# Patient Record
Sex: FEMALE | Race: BLACK OR AFRICAN AMERICAN | NOT HISPANIC OR LATINO | URBAN - METROPOLITAN AREA
[De-identification: names, ages, dates, MRNs, and addresses within clinical notes are randomized per-mention and may not be internally consistent; named-entity substitution may affect disease eponyms.]

---

## 2017-07-16 ENCOUNTER — EMERGENCY (EMERGENCY)
Facility: HOSPITAL | Age: 57
LOS: 0 days | Discharge: HOME | End: 2017-07-16
Attending: EMERGENCY MEDICINE
Payer: COMMERCIAL

## 2017-07-16 VITALS
WEIGHT: 184.97 LBS | DIASTOLIC BLOOD PRESSURE: 70 MMHG | HEIGHT: 67 IN | TEMPERATURE: 99 F | OXYGEN SATURATION: 98 % | RESPIRATION RATE: 16 BRPM | HEART RATE: 74 BPM | SYSTOLIC BLOOD PRESSURE: 132 MMHG

## 2017-07-16 DIAGNOSIS — H57.8 OTHER SPECIFIED DISORDERS OF EYE AND ADNEXA: ICD-10-CM

## 2017-07-16 DIAGNOSIS — B30.9 VIRAL CONJUNCTIVITIS, UNSPECIFIED: ICD-10-CM

## 2017-07-16 DIAGNOSIS — H57.12 OCULAR PAIN, LEFT EYE: ICD-10-CM

## 2017-07-16 PROCEDURE — 99283 EMERGENCY DEPT VISIT LOW MDM: CPT

## 2017-07-16 NOTE — ED PROVIDER NOTE - EYES, MLM
Pupils equal, round and reactive to light, left erythematous conjunctiva, visual acuity grossly intact

## 2017-07-16 NOTE — ED ADULT NURSE NOTE - OBJECTIVE STATEMENT
56 y o female c/o L eye redness, tearing, discomfort x today. 56 y o female c/o L eye redness, tearing, discomfort x today. denies changes in vision.

## 2020-01-19 ENCOUNTER — EMERGENCY (EMERGENCY)
Facility: HOSPITAL | Age: 60
LOS: 0 days | Discharge: HOME | End: 2020-01-19
Attending: EMERGENCY MEDICINE
Payer: MEDICAID

## 2020-01-19 VITALS
HEIGHT: 67 IN | TEMPERATURE: 102 F | DIASTOLIC BLOOD PRESSURE: 79 MMHG | RESPIRATION RATE: 17 BRPM | OXYGEN SATURATION: 109 % | HEART RATE: 109 BPM | SYSTOLIC BLOOD PRESSURE: 127 MMHG | WEIGHT: 184.09 LBS

## 2020-01-19 VITALS
DIASTOLIC BLOOD PRESSURE: 85 MMHG | TEMPERATURE: 101 F | HEART RATE: 89 BPM | RESPIRATION RATE: 16 BRPM | SYSTOLIC BLOOD PRESSURE: 156 MMHG | OXYGEN SATURATION: 100 %

## 2020-01-19 DIAGNOSIS — J11.1 INFLUENZA DUE TO UNIDENTIFIED INFLUENZA VIRUS WITH OTHER RESPIRATORY MANIFESTATIONS: ICD-10-CM

## 2020-01-19 DIAGNOSIS — R50.9 FEVER, UNSPECIFIED: ICD-10-CM

## 2020-01-19 DIAGNOSIS — R52 PAIN, UNSPECIFIED: ICD-10-CM

## 2020-01-19 DIAGNOSIS — I10 ESSENTIAL (PRIMARY) HYPERTENSION: ICD-10-CM

## 2020-01-19 DIAGNOSIS — R07.9 CHEST PAIN, UNSPECIFIED: ICD-10-CM

## 2020-01-19 LAB
ALBUMIN SERPL ELPH-MCNC: 3.7 G/DL — SIGNIFICANT CHANGE UP (ref 3.3–5)
ALP SERPL-CCNC: 81 U/L — SIGNIFICANT CHANGE UP (ref 40–120)
ALT FLD-CCNC: 29 U/L — SIGNIFICANT CHANGE UP (ref 12–78)
ANION GAP SERPL CALC-SCNC: 7 MMOL/L — SIGNIFICANT CHANGE UP (ref 5–17)
ANISOCYTOSIS BLD QL: SLIGHT — SIGNIFICANT CHANGE UP
AST SERPL-CCNC: 23 U/L — SIGNIFICANT CHANGE UP (ref 15–37)
BASOPHILS # BLD AUTO: 0 K/UL — SIGNIFICANT CHANGE UP (ref 0–0.2)
BASOPHILS NFR BLD AUTO: 0 % — SIGNIFICANT CHANGE UP (ref 0–2)
BILIRUB SERPL-MCNC: 0.5 MG/DL — SIGNIFICANT CHANGE UP (ref 0.2–1.2)
BUN SERPL-MCNC: 13 MG/DL — SIGNIFICANT CHANGE UP (ref 7–23)
CALCIUM SERPL-MCNC: 8.9 MG/DL — SIGNIFICANT CHANGE UP (ref 8.5–10.1)
CHLORIDE SERPL-SCNC: 100 MMOL/L — SIGNIFICANT CHANGE UP (ref 96–108)
CO2 SERPL-SCNC: 31 MMOL/L — SIGNIFICANT CHANGE UP (ref 22–31)
CREAT SERPL-MCNC: 1.18 MG/DL — SIGNIFICANT CHANGE UP (ref 0.5–1.3)
EOSINOPHIL # BLD AUTO: 0 K/UL — SIGNIFICANT CHANGE UP (ref 0–0.5)
EOSINOPHIL NFR BLD AUTO: 0 % — SIGNIFICANT CHANGE UP (ref 0–6)
GLUCOSE SERPL-MCNC: 108 MG/DL — HIGH (ref 70–99)
HCT VFR BLD CALC: 45 % — SIGNIFICANT CHANGE UP (ref 34.5–45)
HGB BLD-MCNC: 14.6 G/DL — SIGNIFICANT CHANGE UP (ref 11.5–15.5)
LYMPHOCYTES # BLD AUTO: 1.19 K/UL — SIGNIFICANT CHANGE UP (ref 1–3.3)
LYMPHOCYTES # BLD AUTO: 17 % — SIGNIFICANT CHANGE UP (ref 13–44)
MAGNESIUM SERPL-MCNC: 2.2 MG/DL — SIGNIFICANT CHANGE UP (ref 1.6–2.6)
MANUAL SMEAR VERIFICATION: SIGNIFICANT CHANGE UP
MCHC RBC-ENTMCNC: 31.1 PG — SIGNIFICANT CHANGE UP (ref 27–34)
MCHC RBC-ENTMCNC: 32.4 GM/DL — SIGNIFICANT CHANGE UP (ref 32–36)
MCV RBC AUTO: 95.7 FL — SIGNIFICANT CHANGE UP (ref 80–100)
MICROCYTES BLD QL: SLIGHT — SIGNIFICANT CHANGE UP
MONOCYTES # BLD AUTO: 0.98 K/UL — HIGH (ref 0–0.9)
MONOCYTES NFR BLD AUTO: 14 % — SIGNIFICANT CHANGE UP (ref 2–14)
NEUTROPHILS # BLD AUTO: 4.82 K/UL — SIGNIFICANT CHANGE UP (ref 1.8–7.4)
NEUTROPHILS NFR BLD AUTO: 69 % — SIGNIFICANT CHANGE UP (ref 43–77)
NRBC # BLD: 0 /100 — SIGNIFICANT CHANGE UP (ref 0–0)
NRBC # BLD: SIGNIFICANT CHANGE UP /100 WBCS (ref 0–0)
PLAT MORPH BLD: NORMAL — SIGNIFICANT CHANGE UP
PLATELET # BLD AUTO: 189 K/UL — SIGNIFICANT CHANGE UP (ref 150–400)
POTASSIUM SERPL-MCNC: 3.5 MMOL/L — SIGNIFICANT CHANGE UP (ref 3.5–5.3)
POTASSIUM SERPL-SCNC: 3.5 MMOL/L — SIGNIFICANT CHANGE UP (ref 3.5–5.3)
PROT SERPL-MCNC: 7.5 GM/DL — SIGNIFICANT CHANGE UP (ref 6–8.3)
RBC # BLD: 4.7 M/UL — SIGNIFICANT CHANGE UP (ref 3.8–5.2)
RBC # FLD: 14 % — SIGNIFICANT CHANGE UP (ref 10.3–14.5)
RBC BLD AUTO: ABNORMAL
SODIUM SERPL-SCNC: 138 MMOL/L — SIGNIFICANT CHANGE UP (ref 135–145)
TOXIC GRANULES BLD QL SMEAR: PRESENT — SIGNIFICANT CHANGE UP
WBC # BLD: 6.98 K/UL — SIGNIFICANT CHANGE UP (ref 3.8–10.5)
WBC # FLD AUTO: 6.98 K/UL — SIGNIFICANT CHANGE UP (ref 3.8–10.5)

## 2020-01-19 PROCEDURE — 99284 EMERGENCY DEPT VISIT MOD MDM: CPT

## 2020-01-19 RX ORDER — LOSARTAN/HYDROCHLOROTHIAZIDE 100MG-25MG
1 TABLET ORAL
Qty: 0 | Refills: 0 | DISCHARGE

## 2020-01-19 RX ORDER — SODIUM CHLORIDE 9 MG/ML
1000 INJECTION, SOLUTION INTRAVENOUS ONCE
Refills: 0 | Status: COMPLETED | OUTPATIENT
Start: 2020-01-19 | End: 2020-01-19

## 2020-01-19 RX ORDER — ACETAMINOPHEN 500 MG
975 TABLET ORAL ONCE
Refills: 0 | Status: COMPLETED | OUTPATIENT
Start: 2020-01-19 | End: 2020-01-19

## 2020-01-19 RX ORDER — PHENYLEPHRINE/DM/ACETAMINOP/GG 5-10-325MG
2 TABLET ORAL
Qty: 30 | Refills: 0
Start: 2020-01-19 | End: 2020-01-23

## 2020-01-19 RX ORDER — SODIUM CHLORIDE 9 MG/ML
1000 INJECTION INTRAMUSCULAR; INTRAVENOUS; SUBCUTANEOUS ONCE
Refills: 0 | Status: COMPLETED | OUTPATIENT
Start: 2020-01-19 | End: 2020-01-19

## 2020-01-19 RX ORDER — KETOROLAC TROMETHAMINE 30 MG/ML
30 SYRINGE (ML) INJECTION ONCE
Refills: 0 | Status: DISCONTINUED | OUTPATIENT
Start: 2020-01-19 | End: 2020-01-19

## 2020-01-19 RX ADMIN — SODIUM CHLORIDE 1000 MILLILITER(S): 9 INJECTION, SOLUTION INTRAVENOUS at 11:18

## 2020-01-19 RX ADMIN — Medication 30 MILLIGRAM(S): at 11:23

## 2020-01-19 RX ADMIN — Medication 75 MILLIGRAM(S): at 11:22

## 2020-01-19 RX ADMIN — SODIUM CHLORIDE 1000 MILLILITER(S): 9 INJECTION INTRAMUSCULAR; INTRAVENOUS; SUBCUTANEOUS at 11:18

## 2020-01-19 RX ADMIN — Medication 100 MILLIGRAM(S): at 11:22

## 2020-01-19 RX ADMIN — Medication 975 MILLIGRAM(S): at 11:23

## 2020-01-19 NOTE — ED PROVIDER NOTE - PATIENT PORTAL LINK FT
You can access the FollowMyHealth Patient Portal offered by [facility] by registering at the following website: [email or web address]. By joining [facility]’s FollowMyHealth portal, you will also be able to view your health information using other applications (apps) compatible with our system.

## 2020-01-19 NOTE — ED PROVIDER NOTE - OBJECTIVE STATEMENT
60 y/o F with a PMHx of HTN presents with complains of flu like symptoms. She states her symptoms beginning yesterday and the day before she was feeling well. She states that she has had fevers, cp, body aches, and headache. she reports she did obtain her flu shot. she reports in previous coughing episodes she had some burning sensations but currently the burning has subsided.

## 2020-01-19 NOTE — ED PROVIDER NOTE - CLINICAL SUMMARY MEDICAL DECISION MAKING FREE TEXT BOX
viral uri likely influenza. will start Tamiflu; rule out pneumonia viral uri likely influenza. will start Tamiflu; rule out pneumonia. xray clear and labs reassuring. after fluids and pain medicine and anti-pyretics pt feeling better. ok for dc home.

## 2020-01-19 NOTE — ED PROVIDER NOTE - PHYSICAL EXAMINATION
Gen: Alert, NAD  Head: NC, AT   Eyes: PERRL, EOMI, normal lids/conjunctiva  ENT: normal hearing, patent oropharynx without erythema/exudate, uvula midline  Neck: supple, no tenderness, Trachea midline  Pulm: +tachycardic, Bilateral BS, normal resp effort, no wheeze/stridor/retractions  CV: RRR, no M/R/G, 2+ radial and dp pulses bl, no edema  Abd: soft, NT/ND, +BS, no hepatosplenomegaly  Mskel: extremities x4 with normal ROM and no joint effusions. no ctl spine ttp.   Skin: no rash, no bruising   Neuro: AAOx3, no sensory/motor deficits, CN 2-12 intact

## 2020-01-19 NOTE — ED ADULT NURSE NOTE - OBJECTIVE STATEMENT
pt a&o x3, pt c.o of fever at home, chills, cough, body aches x 1 days. pt denies shortness of breath + cough

## 2020-01-19 NOTE — ED PROVIDER NOTE - NSFOLLOWUPINSTRUCTIONS_ED_ALL_ED_FT
Unknown
Take the OSELTAMIVIR as prescribed to help with flu symptoms.    Take the cough medicine as needed.    Get plenty of rest and keep well hydrated.

## 2022-08-21 NOTE — ED ADULT NURSE NOTE - NS ED NOTE  TALK SOMEONE YN
Thank you for allowing us to provide you with medical care today. We realize that you have many choices for your emergency care needs. We thank you for choosing [facility]. Please choose us in the future for any continued health care needs. We hope we addressed all of your medical concerns. We strive to provide excellent quality care in the Emergency Department. Anything less than excellent does not meet our expectations. The exam and treatment you received in the Emergency Department were for an emergent problem and are not intended as complete care. It is important that you follow up with a doctor, nurse practitioner, or physician's assistant for ongoing care. If your symptoms worsen or you do not improve as expected and you are unable to reach your usual health care provider, you should return to the Emergency Department. We are available 24 hours a day. Take this sheet with you when you go to your follow-up visit. If you have any problem arranging the follow-up visit, contact the Emergency Department immediately. Make an appointment your family doctor for follow up of this visit. Return to the ER if you are unable to be seen in a timely manner.
No

## 2023-03-03 ENCOUNTER — EMERGENCY (EMERGENCY)
Facility: HOSPITAL | Age: 63
LOS: 0 days | Discharge: HOME | End: 2023-03-03
Attending: EMERGENCY MEDICINE
Payer: COMMERCIAL

## 2023-03-03 VITALS
DIASTOLIC BLOOD PRESSURE: 80 MMHG | RESPIRATION RATE: 17 BRPM | TEMPERATURE: 98 F | SYSTOLIC BLOOD PRESSURE: 146 MMHG | HEART RATE: 79 BPM | OXYGEN SATURATION: 97 %

## 2023-03-03 VITALS
SYSTOLIC BLOOD PRESSURE: 136 MMHG | DIASTOLIC BLOOD PRESSURE: 83 MMHG | HEIGHT: 67 IN | HEART RATE: 96 BPM | RESPIRATION RATE: 15 BRPM | TEMPERATURE: 99 F | OXYGEN SATURATION: 97 % | WEIGHT: 194.89 LBS

## 2023-03-03 DIAGNOSIS — U07.1 COVID-19: ICD-10-CM

## 2023-03-03 DIAGNOSIS — I10 ESSENTIAL (PRIMARY) HYPERTENSION: ICD-10-CM

## 2023-03-03 DIAGNOSIS — R05.1 ACUTE COUGH: ICD-10-CM

## 2023-03-03 DIAGNOSIS — M79.10 MYALGIA, UNSPECIFIED SITE: ICD-10-CM

## 2023-03-03 DIAGNOSIS — R09.81 NASAL CONGESTION: ICD-10-CM

## 2023-03-03 LAB
ALBUMIN SERPL ELPH-MCNC: 3.5 G/DL — SIGNIFICANT CHANGE UP (ref 3.3–5)
ALP SERPL-CCNC: 75 U/L — SIGNIFICANT CHANGE UP (ref 40–120)
ALT FLD-CCNC: 29 U/L — SIGNIFICANT CHANGE UP (ref 12–78)
ANION GAP SERPL CALC-SCNC: 5 MMOL/L — SIGNIFICANT CHANGE UP (ref 5–17)
APPEARANCE UR: CLEAR — SIGNIFICANT CHANGE UP
APTT BLD: 55.4 SEC — HIGH (ref 27.5–35.5)
AST SERPL-CCNC: 19 U/L — SIGNIFICANT CHANGE UP (ref 15–37)
BASOPHILS # BLD AUTO: 0.07 K/UL — SIGNIFICANT CHANGE UP (ref 0–0.2)
BASOPHILS NFR BLD AUTO: 1 % — SIGNIFICANT CHANGE UP (ref 0–2)
BILIRUB SERPL-MCNC: 0.5 MG/DL — SIGNIFICANT CHANGE UP (ref 0.2–1.2)
BILIRUB UR-MCNC: NEGATIVE — SIGNIFICANT CHANGE UP
BUN SERPL-MCNC: 8 MG/DL — SIGNIFICANT CHANGE UP (ref 7–23)
CALCIUM SERPL-MCNC: 9.3 MG/DL — SIGNIFICANT CHANGE UP (ref 8.5–10.1)
CHLORIDE SERPL-SCNC: 105 MMOL/L — SIGNIFICANT CHANGE UP (ref 96–108)
CO2 SERPL-SCNC: 29 MMOL/L — SIGNIFICANT CHANGE UP (ref 22–31)
COLOR SPEC: YELLOW — SIGNIFICANT CHANGE UP
CREAT SERPL-MCNC: 1 MG/DL — SIGNIFICANT CHANGE UP (ref 0.5–1.3)
DIFF PNL FLD: ABNORMAL
EGFR: 63 ML/MIN/1.73M2 — SIGNIFICANT CHANGE UP
EOSINOPHIL # BLD AUTO: 0.07 K/UL — SIGNIFICANT CHANGE UP (ref 0–0.5)
EOSINOPHIL NFR BLD AUTO: 1 % — SIGNIFICANT CHANGE UP (ref 0–6)
FLUAV AG NPH QL: SIGNIFICANT CHANGE UP
FLUBV AG NPH QL: SIGNIFICANT CHANGE UP
GLUCOSE SERPL-MCNC: 96 MG/DL — SIGNIFICANT CHANGE UP (ref 70–99)
GLUCOSE UR QL: NEGATIVE MG/DL — SIGNIFICANT CHANGE UP
HCT VFR BLD CALC: 41.6 % — SIGNIFICANT CHANGE UP (ref 34.5–45)
HGB BLD-MCNC: 13.5 G/DL — SIGNIFICANT CHANGE UP (ref 11.5–15.5)
INR BLD: 1.06 RATIO — SIGNIFICANT CHANGE UP (ref 0.88–1.16)
KETONES UR-MCNC: NEGATIVE — SIGNIFICANT CHANGE UP
LACTATE SERPL-SCNC: 0.9 MMOL/L — SIGNIFICANT CHANGE UP (ref 0.7–2)
LEUKOCYTE ESTERASE UR-ACNC: NEGATIVE — SIGNIFICANT CHANGE UP
LYMPHOCYTES # BLD AUTO: 0.81 K/UL — LOW (ref 1–3.3)
LYMPHOCYTES # BLD AUTO: 12 % — LOW (ref 13–44)
MANUAL SMEAR VERIFICATION: SIGNIFICANT CHANGE UP
MCHC RBC-ENTMCNC: 31.4 PG — SIGNIFICANT CHANGE UP (ref 27–34)
MCHC RBC-ENTMCNC: 32.5 G/DL — SIGNIFICANT CHANGE UP (ref 32–36)
MCV RBC AUTO: 96.7 FL — SIGNIFICANT CHANGE UP (ref 80–100)
MONOCYTES # BLD AUTO: 1.15 K/UL — HIGH (ref 0–0.9)
MONOCYTES NFR BLD AUTO: 17 % — HIGH (ref 2–14)
NEUTROPHILS # BLD AUTO: 4.65 K/UL — SIGNIFICANT CHANGE UP (ref 1.8–7.4)
NEUTROPHILS NFR BLD AUTO: 69 % — SIGNIFICANT CHANGE UP (ref 43–77)
NITRITE UR-MCNC: NEGATIVE — SIGNIFICANT CHANGE UP
NRBC # BLD: 0 /100 — SIGNIFICANT CHANGE UP (ref 0–0)
NRBC # BLD: SIGNIFICANT CHANGE UP /100 WBCS (ref 0–0)
PH UR: 7 — SIGNIFICANT CHANGE UP (ref 5–8)
PLAT MORPH BLD: NORMAL — SIGNIFICANT CHANGE UP
PLATELET # BLD AUTO: 172 K/UL — SIGNIFICANT CHANGE UP (ref 150–400)
POTASSIUM SERPL-MCNC: 3.6 MMOL/L — SIGNIFICANT CHANGE UP (ref 3.5–5.3)
POTASSIUM SERPL-SCNC: 3.6 MMOL/L — SIGNIFICANT CHANGE UP (ref 3.5–5.3)
PROT SERPL-MCNC: 6.9 GM/DL — SIGNIFICANT CHANGE UP (ref 6–8.3)
PROT UR-MCNC: NEGATIVE MG/DL — SIGNIFICANT CHANGE UP
PROTHROM AB SERPL-ACNC: 12.7 SEC — SIGNIFICANT CHANGE UP (ref 10.5–13.4)
RBC # BLD: 4.3 M/UL — SIGNIFICANT CHANGE UP (ref 3.8–5.2)
RBC # FLD: 13.5 % — SIGNIFICANT CHANGE UP (ref 10.3–14.5)
RBC BLD AUTO: NORMAL — SIGNIFICANT CHANGE UP
RBC CASTS # UR COMP ASSIST: NEGATIVE /HPF — SIGNIFICANT CHANGE UP (ref 0–4)
SARS-COV-2 RNA SPEC QL NAA+PROBE: DETECTED
SODIUM SERPL-SCNC: 139 MMOL/L — SIGNIFICANT CHANGE UP (ref 135–145)
SP GR SPEC: 1 — LOW (ref 1.01–1.02)
TOXIC GRANULES BLD QL SMEAR: PRESENT — SIGNIFICANT CHANGE UP
UROBILINOGEN FLD QL: NEGATIVE MG/DL — SIGNIFICANT CHANGE UP
WBC # BLD: 6.74 K/UL — SIGNIFICANT CHANGE UP (ref 3.8–10.5)
WBC # FLD AUTO: 6.74 K/UL — SIGNIFICANT CHANGE UP (ref 3.8–10.5)
WBC UR QL: NEGATIVE — SIGNIFICANT CHANGE UP

## 2023-03-03 PROCEDURE — 99285 EMERGENCY DEPT VISIT HI MDM: CPT

## 2023-03-03 PROCEDURE — 71045 X-RAY EXAM CHEST 1 VIEW: CPT | Mod: 26

## 2023-03-03 PROCEDURE — 93010 ELECTROCARDIOGRAM REPORT: CPT

## 2023-03-03 RX ORDER — ACETAMINOPHEN 500 MG
1000 TABLET ORAL ONCE
Refills: 0 | Status: COMPLETED | OUTPATIENT
Start: 2023-03-03 | End: 2023-03-03

## 2023-03-03 RX ORDER — IBUPROFEN 200 MG
1 TABLET ORAL
Qty: 15 | Refills: 0
Start: 2023-03-03 | End: 2023-03-07

## 2023-03-03 RX ORDER — SODIUM CHLORIDE 9 MG/ML
2700 INJECTION, SOLUTION INTRAVENOUS ONCE
Refills: 0 | Status: COMPLETED | OUTPATIENT
Start: 2023-03-03 | End: 2023-03-03

## 2023-03-03 RX ADMIN — Medication 400 MILLIGRAM(S): at 15:47

## 2023-03-03 RX ADMIN — SODIUM CHLORIDE 2700 MILLILITER(S): 9 INJECTION, SOLUTION INTRAVENOUS at 14:48

## 2023-03-03 RX ADMIN — Medication 200 MILLIGRAM(S): at 15:47

## 2023-03-03 NOTE — ED ADULT TRIAGE NOTE - CHIEF COMPLAINT QUOTE
Patient c/o generalized body aches dry cough and fever since yesterday. Patient took Tylenol last night with partial relief. Denies any sick contacts  hx HTN

## 2023-03-03 NOTE — ED PROVIDER NOTE - CONSTITUTIONAL, MLM
normal... Well appearing, awake, alert, oriented to person, place, time/situation and in no apparent distress. Speaking in clear full sentences no nasal flaring no shoulders retractions no diaphoresis + coughing

## 2023-03-03 NOTE — ED PROVIDER NOTE - GASTROINTESTINAL NEGATIVE STATEMENT, MLM
Patient was in the ER yesterday for palps, but states she feels fine today. Pt is unsure if she needs to be seen. Can you please review the ED note and see if the pt should be seen, thanks. You can reach the pt at [phone]. no abdominal pain, no bloating, no constipation, no diarrhea, no nausea and no vomiting.

## 2023-03-03 NOTE — ED PROVIDER NOTE - CARE PLAN
Principal Discharge DX:	Upper respiratory infection  Secondary Diagnosis:	2019 novel coronavirus disease (COVID-19)   1

## 2023-03-03 NOTE — ED ADULT NURSE NOTE - OBJECTIVE STATEMENT
Patient is alert and oriented x4. Complains of generalized body pain, weakness and cough that started yesterday. Denies n/v/d, fever or shortness of breath. Lungs clear on auscultation. Able to ambulate. Placed on cardiac monitor.

## 2023-03-03 NOTE — ED PROVIDER NOTE - PROGRESS NOTE DETAILS
Pt sts she is feeling much better denies headache, dizziness, neck/back pain, sob, chest pain, nausea, vomiting, abd pain.

## 2023-03-03 NOTE — ED PROVIDER NOTE - PATIENT PORTAL LINK FT
You can access the FollowMyHealth Patient Portal offered by [facility] by registering at the following website: [email or web address]. By joining [employer]’s FollowMyHealth portal, you will also be able to view your health information using other applications (apps) compatible with our system.

## 2023-03-03 NOTE — ED PROVIDER NOTE - OBJECTIVE STATEMENT
62 years old female here c/o coughing, nasal congestions ears pain body ache chils since last night. Pt denies recent hx of traveling headache, dizziness, blurred visions, light sensitivities, focal/distal weakness or numbness, neck/back/hips/calfs pain, sob, chest pain, nausea, vomiting, fever, dysuria, or irregular bowel movements. Pt had Pfizer x 3.

## 2023-03-06 LAB
-  AMIKACIN: SIGNIFICANT CHANGE UP
-  AMOXICILLIN/CLAVULANIC ACID: SIGNIFICANT CHANGE UP
-  AMPICILLIN/SULBACTAM: SIGNIFICANT CHANGE UP
-  AMPICILLIN: SIGNIFICANT CHANGE UP
-  AZTREONAM: SIGNIFICANT CHANGE UP
-  CEFAZOLIN: SIGNIFICANT CHANGE UP
-  CEFEPIME: SIGNIFICANT CHANGE UP
-  CEFOXITIN: SIGNIFICANT CHANGE UP
-  CEFTRIAXONE: SIGNIFICANT CHANGE UP
-  CEFUROXIME: SIGNIFICANT CHANGE UP
-  CIPROFLOXACIN: SIGNIFICANT CHANGE UP
-  ERTAPENEM: SIGNIFICANT CHANGE UP
-  GENTAMICIN: SIGNIFICANT CHANGE UP
-  LEVOFLOXACIN: SIGNIFICANT CHANGE UP
-  MEROPENEM: SIGNIFICANT CHANGE UP
-  NITROFURANTOIN: SIGNIFICANT CHANGE UP
-  PIPERACILLIN/TAZOBACTAM: SIGNIFICANT CHANGE UP
-  TOBRAMYCIN: SIGNIFICANT CHANGE UP
-  TRIMETHOPRIM/SULFAMETHOXAZOLE: SIGNIFICANT CHANGE UP
CULTURE RESULTS: SIGNIFICANT CHANGE UP
METHOD TYPE: SIGNIFICANT CHANGE UP
ORGANISM # SPEC MICROSCOPIC CNT: SIGNIFICANT CHANGE UP
ORGANISM # SPEC MICROSCOPIC CNT: SIGNIFICANT CHANGE UP
SPECIMEN SOURCE: SIGNIFICANT CHANGE UP

## 2023-03-08 LAB
CULTURE RESULTS: SIGNIFICANT CHANGE UP
CULTURE RESULTS: SIGNIFICANT CHANGE UP
SPECIMEN SOURCE: SIGNIFICANT CHANGE UP
SPECIMEN SOURCE: SIGNIFICANT CHANGE UP

## 2023-04-25 ENCOUNTER — NON-APPOINTMENT (OUTPATIENT)
Age: 63
End: 2023-04-25

## 2023-04-25 ENCOUNTER — APPOINTMENT (OUTPATIENT)
Dept: INTERNAL MEDICINE | Facility: CLINIC | Age: 63
End: 2023-04-25
Payer: COMMERCIAL

## 2023-04-25 VITALS
OXYGEN SATURATION: 97 % | HEART RATE: 74 BPM | BODY MASS INDEX: 30.29 KG/M2 | WEIGHT: 193 LBS | DIASTOLIC BLOOD PRESSURE: 88 MMHG | HEIGHT: 67 IN | SYSTOLIC BLOOD PRESSURE: 148 MMHG | TEMPERATURE: 97.6 F

## 2023-04-25 VITALS — DIASTOLIC BLOOD PRESSURE: 78 MMHG | SYSTOLIC BLOOD PRESSURE: 140 MMHG

## 2023-04-25 DIAGNOSIS — M79.671 PAIN IN RIGHT FOOT: ICD-10-CM

## 2023-04-25 DIAGNOSIS — Z82.49 FAMILY HISTORY OF ISCHEMIC HEART DISEASE AND OTHER DISEASES OF THE CIRCULATORY SYSTEM: ICD-10-CM

## 2023-04-25 DIAGNOSIS — F17.200 NICOTINE DEPENDENCE, UNSPECIFIED, UNCOMPLICATED: ICD-10-CM

## 2023-04-25 PROCEDURE — 99386 PREV VISIT NEW AGE 40-64: CPT | Mod: 25

## 2023-04-25 PROCEDURE — 93000 ELECTROCARDIOGRAM COMPLETE: CPT

## 2023-04-25 PROCEDURE — 36415 COLL VENOUS BLD VENIPUNCTURE: CPT

## 2023-04-25 NOTE — ASSESSMENT
[FreeTextEntry1] : HCM\par -well visit\par -routine labs- drawn in house\par -depression screen negative\par -ekg- SR, no prior for comparison\par -mammogram- due, script given\par -pap smear- 2021, gyn referral given per patient request\par -colonoscopy- 2017, was due for repeat last year; GI referral given\par -flu vaccine- advised to get in the fall 2023\par -tdap- she will check her records\par -recommended shingles vaccine\par -advised to get annual eye, dental, and skin exams\par \par HTN\par -bp elevated likely, as patient has run of out her losartan-HCTz for past week and has been taking her sister's losartan\par -refill given for losartan-HCTZ\par \par R knee\par R foot pain\par -s/p injury\par -rest, ice, trial nsaids\par -xrays ordered

## 2023-04-25 NOTE — HISTORY OF PRESENT ILLNESS
[FreeTextEntry1] : cpe/est care [de-identified] : DERECK LEYVA is a 62 year F who presents for CPE/est care\par PMHx HTN\par Prior Dr. Juancarlos Washburn but no longer takes her insurance. \par \par Feels well overall \khai Ran out of losartan-HCTZ 1 week ago and has been taking her sister's losartan 100mg in the interim. \khai Fell 3 weeks ago onto her L knee after missing a step while taking out the garbage. Her R leg and foot dragged across the floor as she feel. Initially had pain and bruising on L knee but that has since completely resolved. Now with pain of R knee and R foot. Tried tylenol with minimal improvement. \par \par Flu vaccine- defers\par Covid vaccine- s/p 3 doses

## 2023-04-25 NOTE — PHYSICAL EXAM
[No Acute Distress] : no acute distress [Well-Appearing] : well-appearing [Normal Sclera/Conjunctiva] : normal sclera/conjunctiva [EOMI] : extraocular movements intact [Normal Outer Ear/Nose] : the outer ears and nose were normal in appearance [Normal Oropharynx] : the oropharynx was normal [Normal TMs] : both tympanic membranes were normal [No Respiratory Distress] : no respiratory distress  [No Accessory Muscle Use] : no accessory muscle use [Clear to Auscultation] : lungs were clear to auscultation bilaterally [Normal Rate] : normal rate  [Regular Rhythm] : with a regular rhythm [Normal S1, S2] : normal S1 and S2 [No Edema] : there was no peripheral edema [No Extremity Clubbing/Cyanosis] : no extremity clubbing/cyanosis [Soft] : abdomen soft [Non Tender] : non-tender [Non-distended] : non-distended [Normal Bowel Sounds] : normal bowel sounds [Coordination Grossly Intact] : coordination grossly intact [No Focal Deficits] : no focal deficits [Normal Gait] : normal gait [Normal Affect] : the affect was normal [Normal Insight/Judgement] : insight and judgment were intact [de-identified] : R knee patellar tenderness, ROM intact; R foot bunion TTP, ROM intact

## 2023-04-25 NOTE — COUNSELING
[Encouraged to pick a quit date and identify support needed to quit] : Encouraged to pick a quit date and identify support needed to quit [Smoking Cessation Program Referral] : Smoking Cessation Program Referral  [Yes] : Willing to quit smoking [FreeTextEntry1] : 3

## 2023-04-25 NOTE — HEALTH RISK ASSESSMENT
[No] : In the past 12 months have you used drugs other than those required for medical reasons? No [Current] : Current [0] : 2) Feeling down, depressed, or hopeless: Not at all (0) [PHQ-2 Negative - No further assessment needed] : PHQ-2 Negative - No further assessment needed [With Family] : lives with family [Employed] : employed [] :  [# Of Children ___] : has [unfilled] children [Feels Safe at Home] : Feels safe at home [Fully functional (bathing, dressing, toileting, transferring, walking, feeding)] : Fully functional (bathing, dressing, toileting, transferring, walking, feeding) [Fully functional (using the telephone, shopping, preparing meals, housekeeping, doing laundry, using] : Fully functional and needs no help or supervision to perform IADLs (using the telephone, shopping, preparing meals, housekeeping, doing laundry, using transportation, managing medications and managing finances) [[id number]] : 0 [MammogramDate] : due  [PapSmearDate] : 2021 [ColonoscopyDate] : due [ColonoscopyComments] : last in 2017, was due for repeat last year [de-identified] : mom, sister (not-biological) [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

## 2023-04-26 LAB
25(OH)D3 SERPL-MCNC: 24.9 NG/ML
ALBUMIN SERPL ELPH-MCNC: 4.1 G/DL
ALP BLD-CCNC: 76 U/L
ALT SERPL-CCNC: 21 U/L
ANION GAP SERPL CALC-SCNC: 8 MMOL/L
AST SERPL-CCNC: 18 U/L
BILIRUB SERPL-MCNC: 0.2 MG/DL
BUN SERPL-MCNC: 15 MG/DL
CALCIUM SERPL-MCNC: 9.4 MG/DL
CHLORIDE SERPL-SCNC: 106 MMOL/L
CHOLEST SERPL-MCNC: 239 MG/DL
CO2 SERPL-SCNC: 28 MMOL/L
CREAT SERPL-MCNC: 0.93 MG/DL
EGFR: 69 ML/MIN/1.73M2
ESTIMATED AVERAGE GLUCOSE: 131 MG/DL
GLUCOSE SERPL-MCNC: 93 MG/DL
HBA1C MFR BLD HPLC: 6.2 %
HCT VFR BLD CALC: 42 %
HDLC SERPL-MCNC: 72 MG/DL
HGB BLD-MCNC: 13.1 G/DL
LDLC SERPL CALC-MCNC: 154 MG/DL
MCHC RBC-ENTMCNC: 31 PG
MCHC RBC-ENTMCNC: 31.2 GM/DL
MCV RBC AUTO: 99.5 FL
NONHDLC SERPL-MCNC: 167 MG/DL
PLATELET # BLD AUTO: 255 K/UL
POTASSIUM SERPL-SCNC: 4.6 MMOL/L
PROT SERPL-MCNC: 6.5 G/DL
RBC # BLD: 4.22 M/UL
RBC # FLD: 14.4 %
SODIUM SERPL-SCNC: 142 MMOL/L
TRIGL SERPL-MCNC: 65 MG/DL
TSH SERPL-ACNC: 1.1 UIU/ML
WBC # FLD AUTO: 6.39 K/UL

## 2023-05-02 NOTE — ED PROVIDER NOTE - CROS ED GI ALL NEG
Purpose: This writer met with pt (telephonic) for last social service session. Visit was set up virtual initially, but pt requested to call back at a later time. Pt reported being safe and in a private location. Patient reported doing well.     Intervention: Writer went over any last concerning issues (pt reported none). Patient reported being thankful for the time spent in . No other services were needed at this point.     Plan: Patient will be discharged from  and continue with therapy as well as psychiatry.    negative...

## 2023-05-11 ENCOUNTER — APPOINTMENT (OUTPATIENT)
Dept: RADIOLOGY | Facility: CLINIC | Age: 63
End: 2023-05-11
Payer: COMMERCIAL

## 2023-05-11 ENCOUNTER — APPOINTMENT (OUTPATIENT)
Dept: MAMMOGRAPHY | Facility: CLINIC | Age: 63
End: 2023-05-11
Payer: COMMERCIAL

## 2023-05-11 ENCOUNTER — RESULT REVIEW (OUTPATIENT)
Age: 63
End: 2023-05-11

## 2023-05-11 PROCEDURE — 73564 X-RAY EXAM KNEE 4 OR MORE: CPT | Mod: RT

## 2023-05-11 PROCEDURE — 77067 SCR MAMMO BI INCL CAD: CPT

## 2023-05-11 PROCEDURE — 73630 X-RAY EXAM OF FOOT: CPT | Mod: RT

## 2023-05-11 PROCEDURE — 77063 BREAST TOMOSYNTHESIS BI: CPT

## 2023-05-24 ENCOUNTER — APPOINTMENT (OUTPATIENT)
Dept: ORTHOPEDIC SURGERY | Facility: CLINIC | Age: 63
End: 2023-05-24
Payer: COMMERCIAL

## 2023-05-24 VITALS
WEIGHT: 193 LBS | HEIGHT: 67 IN | DIASTOLIC BLOOD PRESSURE: 89 MMHG | SYSTOLIC BLOOD PRESSURE: 145 MMHG | BODY MASS INDEX: 30.29 KG/M2 | HEART RATE: 76 BPM

## 2023-05-24 PROCEDURE — 73564 X-RAY EXAM KNEE 4 OR MORE: CPT | Mod: RT

## 2023-05-24 PROCEDURE — 99204 OFFICE O/P NEW MOD 45 MIN: CPT

## 2023-05-24 PROCEDURE — 73590 X-RAY EXAM OF LOWER LEG: CPT | Mod: RT

## 2023-06-12 ENCOUNTER — EMERGENCY (EMERGENCY)
Facility: HOSPITAL | Age: 63
LOS: 1 days | Discharge: HOME | End: 2023-06-12
Attending: EMERGENCY MEDICINE | Admitting: EMERGENCY MEDICINE
Payer: COMMERCIAL

## 2023-06-12 VITALS
HEART RATE: 84 BPM | RESPIRATION RATE: 18 BRPM | DIASTOLIC BLOOD PRESSURE: 72 MMHG | SYSTOLIC BLOOD PRESSURE: 124 MMHG | TEMPERATURE: 98 F | OXYGEN SATURATION: 100 %

## 2023-06-12 LAB
ALBUMIN SERPL ELPH-MCNC: 4.5 G/DL — SIGNIFICANT CHANGE UP (ref 3.3–5)
ALP SERPL-CCNC: 73 U/L — SIGNIFICANT CHANGE UP (ref 40–120)
ALT FLD-CCNC: 22 U/L — SIGNIFICANT CHANGE UP (ref 4–33)
ANION GAP SERPL CALC-SCNC: 12 MMOL/L — SIGNIFICANT CHANGE UP (ref 7–14)
APPEARANCE UR: CLEAR — SIGNIFICANT CHANGE UP
AST SERPL-CCNC: 19 U/L — SIGNIFICANT CHANGE UP (ref 4–32)
BACTERIA # UR AUTO: ABNORMAL
BASOPHILS # BLD AUTO: 0.05 K/UL — SIGNIFICANT CHANGE UP (ref 0–0.2)
BASOPHILS NFR BLD AUTO: 0.5 % — SIGNIFICANT CHANGE UP (ref 0–2)
BILIRUB SERPL-MCNC: 0.4 MG/DL — SIGNIFICANT CHANGE UP (ref 0.2–1.2)
BILIRUB UR-MCNC: NEGATIVE — SIGNIFICANT CHANGE UP
BUN SERPL-MCNC: 16 MG/DL — SIGNIFICANT CHANGE UP (ref 7–23)
CALCIUM SERPL-MCNC: 9.6 MG/DL — SIGNIFICANT CHANGE UP (ref 8.4–10.5)
CHLORIDE SERPL-SCNC: 101 MMOL/L — SIGNIFICANT CHANGE UP (ref 98–107)
CO2 SERPL-SCNC: 27 MMOL/L — SIGNIFICANT CHANGE UP (ref 22–31)
COLOR SPEC: SIGNIFICANT CHANGE UP
CREAT SERPL-MCNC: 1.07 MG/DL — SIGNIFICANT CHANGE UP (ref 0.5–1.3)
DIFF PNL FLD: NEGATIVE — SIGNIFICANT CHANGE UP
EGFR: 58 ML/MIN/1.73M2 — LOW
EOSINOPHIL # BLD AUTO: 0.23 K/UL — SIGNIFICANT CHANGE UP (ref 0–0.5)
EOSINOPHIL NFR BLD AUTO: 2.1 % — SIGNIFICANT CHANGE UP (ref 0–6)
EPI CELLS # UR: 1 /HPF — SIGNIFICANT CHANGE UP (ref 0–5)
GLUCOSE SERPL-MCNC: 86 MG/DL — SIGNIFICANT CHANGE UP (ref 70–99)
GLUCOSE UR QL: NEGATIVE — SIGNIFICANT CHANGE UP
HCT VFR BLD CALC: 43.6 % — SIGNIFICANT CHANGE UP (ref 34.5–45)
HGB BLD-MCNC: 14.1 G/DL — SIGNIFICANT CHANGE UP (ref 11.5–15.5)
IANC: 6.8 K/UL — SIGNIFICANT CHANGE UP (ref 1.8–7.4)
IMM GRANULOCYTES NFR BLD AUTO: 0.3 % — SIGNIFICANT CHANGE UP (ref 0–0.9)
KETONES UR-MCNC: NEGATIVE — SIGNIFICANT CHANGE UP
LEUKOCYTE ESTERASE UR-ACNC: NEGATIVE — SIGNIFICANT CHANGE UP
LIDOCAIN IGE QN: 24 U/L — SIGNIFICANT CHANGE UP (ref 7–60)
LYMPHOCYTES # BLD AUTO: 2.77 K/UL — SIGNIFICANT CHANGE UP (ref 1–3.3)
LYMPHOCYTES # BLD AUTO: 25 % — SIGNIFICANT CHANGE UP (ref 13–44)
MCHC RBC-ENTMCNC: 31.3 PG — SIGNIFICANT CHANGE UP (ref 27–34)
MCHC RBC-ENTMCNC: 32.3 GM/DL — SIGNIFICANT CHANGE UP (ref 32–36)
MCV RBC AUTO: 96.9 FL — SIGNIFICANT CHANGE UP (ref 80–100)
MONOCYTES # BLD AUTO: 1.21 K/UL — HIGH (ref 0–0.9)
MONOCYTES NFR BLD AUTO: 10.9 % — SIGNIFICANT CHANGE UP (ref 2–14)
NEUTROPHILS # BLD AUTO: 6.8 K/UL — SIGNIFICANT CHANGE UP (ref 1.8–7.4)
NEUTROPHILS NFR BLD AUTO: 61.2 % — SIGNIFICANT CHANGE UP (ref 43–77)
NITRITE UR-MCNC: NEGATIVE — SIGNIFICANT CHANGE UP
NRBC # BLD: 0 /100 WBCS — SIGNIFICANT CHANGE UP (ref 0–0)
NRBC # FLD: 0 K/UL — SIGNIFICANT CHANGE UP (ref 0–0)
PH UR: 6 — SIGNIFICANT CHANGE UP (ref 5–8)
PLATELET # BLD AUTO: 227 K/UL — SIGNIFICANT CHANGE UP (ref 150–400)
POTASSIUM SERPL-MCNC: 3.7 MMOL/L — SIGNIFICANT CHANGE UP (ref 3.5–5.3)
POTASSIUM SERPL-SCNC: 3.7 MMOL/L — SIGNIFICANT CHANGE UP (ref 3.5–5.3)
PROT SERPL-MCNC: 7.7 G/DL — SIGNIFICANT CHANGE UP (ref 6–8.3)
PROT UR-MCNC: NEGATIVE — SIGNIFICANT CHANGE UP
RBC # BLD: 4.5 M/UL — SIGNIFICANT CHANGE UP (ref 3.8–5.2)
RBC # FLD: 13.3 % — SIGNIFICANT CHANGE UP (ref 10.3–14.5)
RBC CASTS # UR COMP ASSIST: SIGNIFICANT CHANGE UP /HPF (ref 0–4)
SODIUM SERPL-SCNC: 140 MMOL/L — SIGNIFICANT CHANGE UP (ref 135–145)
SP GR SPEC: 1.01 — SIGNIFICANT CHANGE UP (ref 1.01–1.05)
UROBILINOGEN FLD QL: SIGNIFICANT CHANGE UP
WBC # BLD: 11.09 K/UL — HIGH (ref 3.8–10.5)
WBC # FLD AUTO: 11.09 K/UL — HIGH (ref 3.8–10.5)
WBC UR QL: SIGNIFICANT CHANGE UP /HPF (ref 0–5)

## 2023-06-12 PROCEDURE — 74176 CT ABD & PELVIS W/O CONTRAST: CPT | Mod: 26,MA

## 2023-06-12 PROCEDURE — 99285 EMERGENCY DEPT VISIT HI MDM: CPT

## 2023-06-12 RX ORDER — ONDANSETRON 8 MG/1
4 TABLET, FILM COATED ORAL ONCE
Refills: 0 | Status: COMPLETED | OUTPATIENT
Start: 2023-06-12 | End: 2023-06-12

## 2023-06-12 RX ORDER — IPRATROPIUM/ALBUTEROL SULFATE 18-103MCG
3 AEROSOL WITH ADAPTER (GRAM) INHALATION ONCE
Refills: 0 | Status: COMPLETED | OUTPATIENT
Start: 2023-06-12 | End: 2023-06-12

## 2023-06-12 RX ORDER — SODIUM CHLORIDE 9 MG/ML
1000 INJECTION, SOLUTION INTRAVENOUS ONCE
Refills: 0 | Status: DISCONTINUED | OUTPATIENT
Start: 2023-06-12 | End: 2023-06-12

## 2023-06-12 RX ORDER — KETOROLAC TROMETHAMINE 30 MG/ML
15 SYRINGE (ML) INJECTION ONCE
Refills: 0 | Status: DISCONTINUED | OUTPATIENT
Start: 2023-06-12 | End: 2023-06-12

## 2023-06-12 RX ORDER — MORPHINE SULFATE 50 MG/1
4 CAPSULE, EXTENDED RELEASE ORAL ONCE
Refills: 0 | Status: DISCONTINUED | OUTPATIENT
Start: 2023-06-12 | End: 2023-06-12

## 2023-06-12 RX ORDER — SODIUM CHLORIDE 9 MG/ML
1000 INJECTION, SOLUTION INTRAVENOUS ONCE
Refills: 0 | Status: COMPLETED | OUTPATIENT
Start: 2023-06-12 | End: 2023-06-12

## 2023-06-12 RX ADMIN — Medication 15 MILLIGRAM(S): at 22:40

## 2023-06-12 RX ADMIN — SODIUM CHLORIDE 1000 MILLILITER(S): 9 INJECTION, SOLUTION INTRAVENOUS at 18:04

## 2023-06-12 RX ADMIN — MORPHINE SULFATE 4 MILLIGRAM(S): 50 CAPSULE, EXTENDED RELEASE ORAL at 18:05

## 2023-06-12 RX ADMIN — Medication 3 MILLILITER(S): at 18:37

## 2023-06-12 RX ADMIN — ONDANSETRON 4 MILLIGRAM(S): 8 TABLET, FILM COATED ORAL at 18:04

## 2023-06-12 NOTE — ED ADULT NURSE NOTE - OBJECTIVE STATEMENT
Patient is A&OX4, awake and alert. Pt coming to ED from home regarding flank pain x1day. Pt states pain is constant, radiating to ABD, sharp, nothing makes pain better or worst. Pt denies fever, chills at home, urinary symptoms. PMH kidney stone, HTN.  respirations are even and unlabored. heart tones audible and regular. ABD is soft, tender, and nondistended. 20g IV placed to RAC, bed lowest position, call bell within reach.

## 2023-06-12 NOTE — ED ADULT TRIAGE NOTE - CHIEF COMPLAINT QUOTE
c/o worsening right sided flank pain since last night reports has Hx of kidney stones int he past but not as severe, HTN

## 2023-06-13 LAB
ANION GAP SERPL CALC-SCNC: 13 MMOL/L — SIGNIFICANT CHANGE UP (ref 7–14)
BASOPHILS # BLD AUTO: 0.04 K/UL — SIGNIFICANT CHANGE UP (ref 0–0.2)
BASOPHILS NFR BLD AUTO: 0.5 % — SIGNIFICANT CHANGE UP (ref 0–2)
BLOOD GAS VENOUS COMPREHENSIVE RESULT: SIGNIFICANT CHANGE UP
BLOOD GAS VENOUS COMPREHENSIVE RESULT: SIGNIFICANT CHANGE UP
BUN SERPL-MCNC: 14 MG/DL — SIGNIFICANT CHANGE UP (ref 7–23)
CALCIUM SERPL-MCNC: 8.9 MG/DL — SIGNIFICANT CHANGE UP (ref 8.4–10.5)
CHLORIDE SERPL-SCNC: 99 MMOL/L — SIGNIFICANT CHANGE UP (ref 98–107)
CO2 SERPL-SCNC: 28 MMOL/L — SIGNIFICANT CHANGE UP (ref 22–31)
CREAT SERPL-MCNC: 0.89 MG/DL — SIGNIFICANT CHANGE UP (ref 0.5–1.3)
CRP SERPL-MCNC: 7.3 MG/L — HIGH
EGFR: 72 ML/MIN/1.73M2 — SIGNIFICANT CHANGE UP
EOSINOPHIL # BLD AUTO: 0.19 K/UL — SIGNIFICANT CHANGE UP (ref 0–0.5)
EOSINOPHIL NFR BLD AUTO: 2.2 % — SIGNIFICANT CHANGE UP (ref 0–6)
GLUCOSE SERPL-MCNC: 125 MG/DL — HIGH (ref 70–99)
HCT VFR BLD CALC: 40.6 % — SIGNIFICANT CHANGE UP (ref 34.5–45)
HGB BLD-MCNC: 13.1 G/DL — SIGNIFICANT CHANGE UP (ref 11.5–15.5)
IANC: 5.6 K/UL — SIGNIFICANT CHANGE UP (ref 1.8–7.4)
IMM GRANULOCYTES NFR BLD AUTO: 0.3 % — SIGNIFICANT CHANGE UP (ref 0–0.9)
LYMPHOCYTES # BLD AUTO: 1.97 K/UL — SIGNIFICANT CHANGE UP (ref 1–3.3)
LYMPHOCYTES # BLD AUTO: 22.5 % — SIGNIFICANT CHANGE UP (ref 13–44)
MCHC RBC-ENTMCNC: 31.3 PG — SIGNIFICANT CHANGE UP (ref 27–34)
MCHC RBC-ENTMCNC: 32.3 GM/DL — SIGNIFICANT CHANGE UP (ref 32–36)
MCV RBC AUTO: 97.1 FL — SIGNIFICANT CHANGE UP (ref 80–100)
MONOCYTES # BLD AUTO: 0.91 K/UL — HIGH (ref 0–0.9)
MONOCYTES NFR BLD AUTO: 10.4 % — SIGNIFICANT CHANGE UP (ref 2–14)
NEUTROPHILS # BLD AUTO: 5.6 K/UL — SIGNIFICANT CHANGE UP (ref 1.8–7.4)
NEUTROPHILS NFR BLD AUTO: 64.1 % — SIGNIFICANT CHANGE UP (ref 43–77)
NRBC # BLD: 0 /100 WBCS — SIGNIFICANT CHANGE UP (ref 0–0)
NRBC # FLD: 0 K/UL — SIGNIFICANT CHANGE UP (ref 0–0)
PLATELET # BLD AUTO: 204 K/UL — SIGNIFICANT CHANGE UP (ref 150–400)
POTASSIUM SERPL-MCNC: 3.2 MMOL/L — LOW (ref 3.5–5.3)
POTASSIUM SERPL-SCNC: 3.2 MMOL/L — LOW (ref 3.5–5.3)
RBC # BLD: 4.18 M/UL — SIGNIFICANT CHANGE UP (ref 3.8–5.2)
RBC # FLD: 13.4 % — SIGNIFICANT CHANGE UP (ref 10.3–14.5)
SODIUM SERPL-SCNC: 140 MMOL/L — SIGNIFICANT CHANGE UP (ref 135–145)
WBC # BLD: 8.74 K/UL — SIGNIFICANT CHANGE UP (ref 3.8–10.5)
WBC # FLD AUTO: 8.74 K/UL — SIGNIFICANT CHANGE UP (ref 3.8–10.5)

## 2023-06-13 PROCEDURE — G1004: CPT

## 2023-06-13 PROCEDURE — 72197 MRI PELVIS W/O & W/DYE: CPT | Mod: 26,MG

## 2023-06-13 PROCEDURE — 99236 HOSP IP/OBS SAME DATE HI 85: CPT

## 2023-06-13 RX ORDER — ATORVASTATIN CALCIUM 80 MG/1
5 TABLET, FILM COATED ORAL AT BEDTIME
Refills: 0 | Status: DISCONTINUED | OUTPATIENT
Start: 2023-06-13 | End: 2023-06-16

## 2023-06-13 RX ORDER — ACETAMINOPHEN 500 MG
650 TABLET ORAL EVERY 6 HOURS
Refills: 0 | Status: DISCONTINUED | OUTPATIENT
Start: 2023-06-13 | End: 2023-06-16

## 2023-06-13 RX ORDER — KETOROLAC TROMETHAMINE 30 MG/ML
15 SYRINGE (ML) INJECTION EVERY 6 HOURS
Refills: 0 | Status: DISCONTINUED | OUTPATIENT
Start: 2023-06-13 | End: 2023-06-14

## 2023-06-13 RX ORDER — LOSARTAN POTASSIUM 100 MG/1
100 TABLET, FILM COATED ORAL DAILY
Refills: 0 | Status: DISCONTINUED | OUTPATIENT
Start: 2023-06-13 | End: 2023-06-16

## 2023-06-13 RX ORDER — OXYCODONE HYDROCHLORIDE 5 MG/1
5 TABLET ORAL EVERY 6 HOURS
Refills: 0 | Status: DISCONTINUED | OUTPATIENT
Start: 2023-06-13 | End: 2023-06-13

## 2023-06-13 RX ADMIN — Medication 650 MILLIGRAM(S): at 23:31

## 2023-06-13 RX ADMIN — Medication 15 MILLIGRAM(S): at 12:53

## 2023-06-13 RX ADMIN — ATORVASTATIN CALCIUM 5 MILLIGRAM(S): 80 TABLET, FILM COATED ORAL at 21:40

## 2023-06-13 RX ADMIN — Medication 15 MILLIGRAM(S): at 13:40

## 2023-06-13 RX ADMIN — LOSARTAN POTASSIUM 100 MILLIGRAM(S): 100 TABLET, FILM COATED ORAL at 06:54

## 2023-06-13 NOTE — ED PROVIDER NOTE - NSICDXFAMILYHX_GEN_ALL_CORE_FT
FAMILY HISTORY:  Mother  Still living? Unknown  Family history of CVA, Age at diagnosis: Age Unknown  FH: HTN (hypertension), Age at diagnosis: Age Unknown

## 2023-06-13 NOTE — ED CDU PROVIDER DISPOSITION NOTE - NSFOLLOWUPINSTRUCTIONS_ED_ALL_ED_FT
Advance activity as tolerated.  Continue all previously prescribed medications as directed unless otherwise instructed.  Follow up with your primary care physician in 48-72 hours- bring copies of your results.  Return to the ER for worsening or persistent symptoms, including but not limited to worsening/persistent pain, numbness, weakness, difficulty urinating, difficulty passing bowel movements, incontinence, difficulty walking, falls, abdominal pain, chest pain, fevers, and/or ANY NEW OR CONCERNING SYMPTOMS. If you have issues obtaining follow up, please call: [phone] to obtain a doctor or specialist who takes your insurance in your area.  You may call [phone] to make an appointment with the internal medicine clinic.     ACUTE LOW BACK PAIN - Ambulatory Care    Acute Low Back Pain    AMBULATORY CARE:    Acute low back pain is sudden discomfort that lasts up to 6 weeks and makes activity difficult.    Common symptoms include the following:    Back stiffness or spasms    Pain down the back or side of one leg    Holding yourself in an unusual position or posture to decrease your back pain    Not being able to find a sitting position that is comfortable    Slow increase in your pain for 24 to 48 hours after you stress your back    Tenderness on your lower back or severe pain when you move your back  Seek care immediately if:    You have severe pain.    You have sudden stiffness and heaviness on both buttocks down to both legs.    You have numbness or weakness in one leg, or pain in both legs.    You have numbness in your genital area or across your lower back.    You cannot control your urine or bowel movements.  Call your doctor if:    You have a fever.    You have pain at night or when you rest.    Your pain does not get better with treatment.    You have pain that worsens when you cough or sneeze.    You suddenly feel something pop or snap in your back.    You have questions or concerns about your condition or care.  The goal of treatment for acute low back pain is to relieve your pain and help you be able to do your regular activities. Most people with acute lower back pain get better within 4 to 6 weeks. You may need any of the following:    NSAIDs, such as ibuprofen, help decrease swelling, pain, and fever. This medicine is available with or without a doctor's order. NSAIDs can cause stomach bleeding or kidney problems in certain people. If you take blood thinner medicine, always ask your healthcare provider if NSAIDs are safe for you. Always read the medicine label and follow directions.    Acetaminophen decreases pain and fever. It is available without a doctor's order. Ask how much to take and how often to take it. Follow directions. Read the labels of all other medicines you are using to see if they also contain acetaminophen, or ask your doctor or pharmacist. Acetaminophen can cause liver damage if not taken correctly.    Muscle relaxers decrease pain by relaxing the muscles in your lower spine.    Prescription pain medicine may be given. Ask your healthcare provider how to take this medicine safely. Some prescription pain medicines contain acetaminophen. Do not take other medicines that contain acetaminophen without talking to your healthcare provider. Too much acetaminophen may cause liver damage. Prescription pain medicine may cause constipation. Ask your healthcare provider how to prevent or treat constipation.  Manage your symptoms:    Stay active as much as you can without causing more pain. Bed rest could make your back pain worse. Start with some light exercises such as walking. Avoid heavy lifting until your pain is gone. Ask for more information about the activities or exercises that are right for you.  Diverse Family Walking for Exercise      Apply heat on your back for 20 to 30 minutes every 2 hours for as many days as directed. Heat helps decrease pain and muscle spasms. Alternate heat and ice.    Apply ice on your back for 15 to 20 minutes every hour or as directed. Use an ice pack, or put crushed ice in a plastic bag. Cover it with a towel before you apply it to your skin. Ice helps prevent tissue damage and decreases swelling and pain.  Prevent acute low back pain:    Use proper body mechanics.  Bend at the hips and knees when you  objects. Do not bend from the waist. Use your leg muscles as you lift the load. Do not use your back. Keep the object close to your chest as you lift it. Try not to twist or lift anything above your waist.  How to Lift Items Safely      Change your position often when you stand for long periods of time. Rest one foot on a small box or footrest, and then switch to the other foot often.    Try not to sit for long periods of time. When you do, sit in a straight-backed chair with your feet flat on the floor. Never reach, pull, or push while you are sitting.    Do exercises that strengthen your back muscles. Warm up before you exercise. Ask your healthcare provider the best exercises for you.  Warm up and Cool Down       Maintain a healthy weight. Ask your healthcare provider what a healthy weight is for you. Ask him or her to help you create a weight loss plan if you are overweight.  Follow up with your doctor as directed: Return for a follow-up visit if you still have pain after 1 to 3 weeks of treatment. You may need to visit an orthopedist if your back pain lasts longer than 12 weeks. Write down your questions so you remember to ask them during your visits.    © [employer] 1973, 2023 Follow-up with orthopedics Dr. Murray within 1-2 weeks, office information listed above please call to make an appointment.  Follow-up with your primary care doctor within 1 week, show copies of your results.  Take ibuprofen 600 mg every 6/8 hours as needed for pain, take with food.  You can also take Tylenol 650 mg every 6 hours.  Return to the ER with any worsening or concerning symptoms, increased pain, fever/chills, difficulty walking, numbness, weakness or any other concerns.

## 2023-06-13 NOTE — ED PROVIDER NOTE - PHYSICAL EXAMINATION
VS:  unremarkable    GEN - mild-mod distress R flank pain;   A+O x3   HEAD - NC/AT     ENT - PEERL, EOMI, mucous membranes    moist , no discharge      NECK: Neck supple, non-tender without lymphadenopathy, no masses, no JVD  PULM - CTA b/l,  symmetric breath sounds  COR -  normal heart sounds    ABD - , ND, NT, soft,  BACK  (+)mild R CVA tenderness, nontender spine     EXTREMS - no edema, no deformity, warm and well perfused    SKIN - no rash    or bruising      NEUROLOGIC - alert, face symmetric, speech fluent, sensation nl, motor no focal deficit.

## 2023-06-13 NOTE — ED PROVIDER NOTE - CLINICAL SUMMARY MEDICAL DECISION MAKING FREE TEXT BOX
67F p/w R flank pain x 1 day, worsening, rad abd.  No fever No vomiting (+)Nausea but not at the moment.  No diarrhea.  No dysuria or hematuria.  Similar to previous presumed kidney stone - was not confirmed, no intervention.  PMHX HTN, kidney stones.  PSHX polypectomy on colonoscopy  VS wnl.  (+)T.  NKA. Pt appears uncomfortable and in pain.  Rx pain meds, check labs and CT eval for kidney stone.  Check urine eval for UTI.  Reass.

## 2023-06-13 NOTE — ED CDU PROVIDER INITIAL DAY NOTE - PHYSICAL EXAMINATION
CONSTITUTIONAL: Well-appearing; well-nourished; in no apparent distress. Non-toxic appearing.   NEURO: Alert & oriented. Gait steady without assistance. Sensory and motor functions are grossly intact.  PSYCH: Mood appropriate. Thought processes intact.   NECK: Supple  CARD: Regular rate and rhythm, no murmurs  RESP: No accessory muscle use; breath sounds clear and equal bilaterally; no wheezes, rhonchi, or rales     ABD: Soft; non-distended; non-tender.   : No CVA tenderness b/l.  MUSCULOSKELETAL/EXTREMITIES: FROM in all four extremities; no extremity edema. Hip: No rash, ecchymosis, or swelling on the right, non-tender to palpation, FROM.   Back: No obvious deformity, ecchymosis, contusions, or rash. There is no bony tenderness to palpation. No paraspinous muscle tenderness. Negative straight leg raise. ROM intact but painful with flexion/extension.   SKIN: Warm; dry; no apparent lesions or exudate

## 2023-06-13 NOTE — ED ADULT NURSE REASSESSMENT NOTE - NS ED NURSE REASSESS COMMENT FT1
Break RN: Patient awake and resting in stretcher; respirations even and unlabored, no signs/symptoms of acute distress. Patient transported in stable condition to CDU, bed 1.

## 2023-06-13 NOTE — ED CDU PROVIDER INITIAL DAY NOTE - PROGRESS NOTE DETAILS
pending MRI to be done. Patient placed in CDU overnight.  Endorsing pain to the right flank.  No nausea no vomiting.  Has no history of trauma.  Not on anticoagulation.  Nontoxic female with tenderness to the right paralumbar point flank area no overlying redness no ecchymosis no fluctuance.  Pending MRI to be done and repeat blood work this afternoon. pt denies any metal or implants. Denies being claustrophobic Received call from Dr. Washington Gallagher.  Case discussed with Dr. Gallagher who recommends discontinuing MRI abdomen.  Discussed case with Dr. Kelby Modi to protocol MRI.  He recommends changing MRI order to MR pelvis bony only w and w/o.

## 2023-06-13 NOTE — ED CDU PROVIDER DISPOSITION NOTE - CLINICAL COURSE
Patient is a 62-year-old female with past medical history of hypertension, hyperlipidemia presents with right flank pain.  Patient reports pain is constant and worsens with movement.  Patient denies any fevers, chills, numbness, weakness, illicit drug use, use of immunosuppressive medications, history of diabetes.  In the emergency department, patient has CT abdomen and pelvis with following impression: "No hydronephrosis or obstructing renal stone. A 3 mm nonobstructive left renal stone. A 2.9 cm hypodense collection within the right iliacus musculature suggests iliopsoas bursitis.  Contrast-enhanced MRI can be performed for further evaluation if clinically indicated."  Patient was placed in the observation unit for MRI.  MRI with following impression: "––––" Patient is a 62-year-old female with past medical history of hypertension, hyperlipidemia presents with right flank pain.  Patient reports pain is constant and worsens with movement.  Patient denies any fevers, chills, numbness, weakness, illicit drug use, use of immunosuppressive medications, history of diabetes.  In the emergency department, patient has CT abdomen and pelvis with following impression: "No hydronephrosis or obstructing renal stone. A 3 mm nonobstructive left renal stone. A 2.9 cm hypodense collection within the right iliacus musculature suggests iliopsoas bursitis.  Contrast-enhanced MRI can be performed for further evaluation if clinically indicated."  Patient was placed in the observation unit for MRI. MRI showing "Mass splaying the iliopsoas tendon on the right with heterogeneous enhancement, peripheral enhancement, and a tail of cranial and caudal fat. Differential considerations include a complex loculated bursal fluid collection, cystic peripheral nerve sheath tumor and atypical lipomatous tumor." Xray with mild osteopenia, ortho recommending weight bear as tolerate, follow up within 1-2 weeks with ortho onc. Discussed all results with pt, she will also f/u with her PMD. At times of discharge pt is well appearing, vitals stable, discharge discussed with CDU attending.

## 2023-06-13 NOTE — ED CDU PROVIDER DISPOSITION NOTE - CARE PLAN
"Encounter Date: 9/12/2017    SCRIBE #1 NOTE: I, Rhondamorenita Dang, am scribing for, and in the presence of,  Ruben Velez MD. I have scribed the following portions of the note - Other sections scribed: HPI and ROS.       History     Chief Complaint   Patient presents with    Hyperglycemia     pt      CC: Elevated Blood Glucose    HPI: This 31 y.o. male with medical history of asthma, hypertension, depression and bipolar disorder presents to the ED via EMS transportation c/o elevated blood glucose levels. Pt reports that he was evaluated at this ED earlier today, but states that he decided to leave against medical advice after being "cursed out" by a staff member. Pt reports that since leaving the ED he has been vomiting up blood and adds, "I fell out". He states that he is also experiencing a "pounding" headache to the frontal region of the head as well as pain to the area of spleen location (notes experiencing a "cut spleen" 10 years ago). Symptoms are acute, constant and severe (10/10). No other associated symptoms. No alleviating factors.           The history is provided by the patient. No  was used.     Review of patient's allergies indicates:  No Known Allergies  Past Medical History:   Diagnosis Date    Asthma     pt states he "grew out of it" and no longer has asthma.    Bipolar disorder     Depression     Hypertension      Past Surgical History:   Procedure Laterality Date    FRACTURE SURGERY      ankle     Family History   Problem Relation Age of Onset    Diabetes Paternal Uncle      Social History   Substance Use Topics    Smoking status: Current Every Day Smoker    Smokeless tobacco: Never Used    Alcohol use No     Review of Systems   Constitutional: Negative for chills and fever.        (+) elevated blood sugar levels; (+) "I fell out"   HENT: Negative for congestion, ear pain, rhinorrhea and sore throat.    Eyes: Negative for pain and visual disturbance. "   Respiratory: Negative for cough and shortness of breath.    Cardiovascular: Negative for chest pain.   Gastrointestinal: Positive for abdominal pain and vomiting (blood). Negative for diarrhea and nausea.        (+) pain to the area of spleen location   Genitourinary: Negative for dysuria.   Musculoskeletal: Negative for back pain and neck pain.   Skin: Negative for rash.   Neurological: Positive for headaches (to the frontal region of the head).       Physical Exam     Initial Vitals [09/12/17 2248]   BP Pulse Resp Temp SpO2   (!) 172/97 101 16 98.8 °F (37.1 °C) 100 %      MAP       122         Physical Exam    Nursing note and vitals reviewed.  Constitutional: He appears well-developed and well-nourished. No distress.   HENT:   Head: Atraumatic.   Eyes: EOM are normal. Pupils are equal, round, and reactive to light.   Neck: Normal range of motion. Neck supple. No JVD present.   Cardiovascular: Normal rate, regular rhythm, normal heart sounds and intact distal pulses. Exam reveals no gallop and no friction rub.    No murmur heard.  Pulmonary/Chest: Breath sounds normal. No respiratory distress. He has no wheezes. He has no rhonchi. He has no rales. He exhibits no tenderness.   Abdominal: Soft. Bowel sounds are normal. He exhibits no distension and no mass. There is no tenderness. There is no rebound and no guarding.   Musculoskeletal: Normal range of motion.   Lymphadenopathy:     He has no cervical adenopathy.   Neurological: He is alert and oriented to person, place, and time. He has normal strength.   Skin: Skin is warm and dry.   Psychiatric:   Patient is agitated and paranoid. Yelling at staff as they walk by his room claiming they are talking about him. Using profanities. Patient AMA'd twice already in the past 48 hours from this ER. In his present state of mind I do not feel patient can AMA again. No PEC at this time due to patient stating he wants to stay, be admitted, and receive treatment.          ED  1 Course   Critical Care  Date/Time: 9/13/2017 1:46 AM  Performed by: MARYA VILLASENOR  Authorized by: MARYA VILLASENOR   Direct patient critical care time: 20 minutes  Additional history critical care time: 10 minutes  Ordering / reviewing critical care time: 10 minutes  Documentation critical care time: 8 minutes  Consulting other physicians critical care time: 5 minutes  Other critical care time: 5 (arranging admission) minutes  Total critical care time (exclusive of procedural time) : 58 minutes  Critical care time was exclusive of separately billable procedures and treating other patients and teaching time.  Critical care was necessary to treat or prevent imminent or life-threatening deterioration of the following conditions: dehydration, renal failure and endocrine crisis.  Critical care was time spent personally by me on the following activities: development of treatment plan with patient or surrogate, discussions with consultants, evaluation of patient's response to treatment, examination of patient, obtaining history from patient or surrogate, ordering and performing treatments and interventions, ordering and review of laboratory studies, re-evaluation of patient's condition and review of old charts.        Labs Reviewed   CBC W/ AUTO DIFFERENTIAL - Abnormal; Notable for the following:        Result Value    WBC 15.40 (*)     MCH 31.2 (*)     Platelets 367 (*)     Gran # 13.0 (*)     Gran% 84.4 (*)     Lymph% 11.1 (*)     All other components within normal limits   COMPREHENSIVE METABOLIC PANEL - Abnormal; Notable for the following:     Sodium 127 (*)     Potassium 6.1 (*)     CO2 7 (*)     Glucose 461 (*)     Creatinine 2.1 (*)     Total Protein 9.7 (*)     Anion Gap 21 (*)     eGFR if  46 (*)     eGFR if non  40 (*)     All other components within normal limits    Narrative:      CO2 and Glucose result(s) called and verbal readback obtained from   America Rolon. ,  09/13/2017 00:27   BETA - HYDROXYBUTYRATE, SERUM - Abnormal; Notable for the following:     Beta-Hydroxybutyrate 6.4 (*)     All other components within normal limits   OSMOLALITY - Abnormal; Notable for the following:     Osmolality 303 (*)     All other components within normal limits   POCT GLUCOSE - Abnormal; Notable for the following:     POCT Glucose 337 (*)     All other components within normal limits   ISTAT PROCEDURE - Abnormal; Notable for the following:     POC PH 7.093 (*)     POC PCO2 26.5 (*)     POC PO2 28 (*)     POC HCO3 8.1 (*)     POC SATURATED O2 34 (*)     POC TCO2 9 (*)     All other components within normal limits   POCT GLUCOSE - Abnormal; Notable for the following:     POCT Glucose 252 (*)     All other components within normal limits   ALCOHOL,MEDICAL (ETHANOL)   TROPONIN I   B-TYPE NATRIURETIC PEPTIDE   LIPASE   MAGNESIUM   URINALYSIS   DRUG SCREEN PANEL, URINE EMERGENCY   POCT GLUCOSE MONITORING CONTINUOUS                        Scribe Attestation:   Scribe #1: I performed the above scribed service and the documentation accurately describes the services I performed. I attest to the accuracy of the note.    Attending Attestation:           Physician Attestation for Scribe:  Physician Attestation Statement for Scribe #1: I, Ruben Velez MD, reviewed documentation, as scribed by Rhonda Dang in my presence, and it is both accurate and complete.                 ED Course      Clinical Impression:   The primary encounter diagnosis was Diabetic ketoacidosis without coma associated with other specified diabetes mellitus. Diagnoses of Acute renal failure, unspecified acute renal failure type, Hyperkalemia, and Malignant hypertension were also pertinent to this visit.                           Ruben Velez MD  09/13/17 0152

## 2023-06-13 NOTE — ED PROVIDER NOTE - PROGRESS NOTE DETAILS
DD ED ATTG:  pt feeling a bit better but still looks uncomfortable but declined further pain meds. Test reviewed with pt and she has a fluid collection in iliacus muscle R side, possible cause of pain as iliopsoas extends to R flank.  No ttp locally to area, Pain not worsened with hip flexion against force.  Unusual finding, pt still having some pain will place in CDU for MRI.  D/w CDU RASTA Monahan.

## 2023-06-13 NOTE — ED CDU PROVIDER DISPOSITION NOTE - PATIENT PORTAL LINK FT
You can access the FollowMyHealth Patient Portal offered by [facility] by registering at the following website: [email or web address]. By joining [facility]’s FollowMyHealth portal, you will also be able to view your health information using other applications (apps) compatible with our system.

## 2023-06-13 NOTE — ED PROVIDER NOTE - OBJECTIVE STATEMENT
67F p/w R flank pain x 1 day, worsening, rad abd.  No fever No vomiting (+)Nausea but not at the moment.  No diarrhea.  No dysuria or hematuria.  Similar to previous presumed kidney stone - was not confirmed, no intervention.  PMHX HTN, kidney stones.  PSHX polypectomy on colonoscopy  VS wnl.  (+)T.  NKA. Pt appears uncomfortable and in pain.  Rx pain meds, check labs and CT eval for kidney stone.  Check urine eval for UTI.  Reass.    VS:  unremarkable    GEN - mild-mod distress R flank pain;   A+O x3   HEAD - NC/AT     ENT - PEERL, EOMI, mucous membranes    moist , no discharge      NECK: Neck supple, non-tender without lymphadenopathy, no masses, no JVD  PULM - CTA b/l,  symmetric breath sounds  COR -  normal heart sounds    ABD - , ND, NT, soft,  BACK  (+)mild R CVA tenderness, nontender spine     EXTREMS - no edema, no deformity, warm and well perfused    SKIN - no rash    or bruising      NEUROLOGIC - alert, face symmetric, speech fluent, sensation nl, motor no focal deficit.

## 2023-06-13 NOTE — ED CDU PROVIDER DISPOSITION NOTE - CARE PROVIDER_API CALL
Venkata Murray  Orthopaedic Surgery  [address], [address]  [place] [address]  Phone: [phone]  Fax: [phone]  Follow Up Time:

## 2023-06-13 NOTE — ED PROVIDER NOTE - IV ALTEPLASE DOOR HIDDEN
Patient last seen on 01/21/2019, no future appt.Per last your note f/u in clinic in 1 year.Last TSH was done on 12/05/2018 and was 0.463.Refill or deny?  Thank You   show

## 2023-06-13 NOTE — ED CDU PROVIDER INITIAL DAY NOTE - OBJECTIVE STATEMENT
67F p/w R flank pain x 1 day, worsening, rad abd.  No fever No vomiting (+)Nausea but not at the moment.  No diarrhea.  No dysuria or hematuria.  Similar to previous presumed kidney stone - was not confirmed, no intervention.  PMHX HTN, kidney stones.  PSHX polypectomy on colonoscopy  VS wnl.  (+)T.  NKA. Pt appears uncomfortable and in pain.  Rx pain meds, check labs and CT eval for kidney stone.  Check urine eval for UTI.  Reass.      CDU RASTA Li: Agree with above. No pain at present given pain medication received. Pt pointing to low right back when questioned where the pain was and notes it was radiating around to her right side. No urinary complaints.  In ED, CT revealed "A 2.9 cm hypodense collection within the right iliacus musculature   suggests iliopsoas bursitis.  Contrast-enhanced MRI can be performed for further evaluation if clinically indicated.", slight leukocytosis of 11k. Pt accepted to CDU for MRI to further characterize CT findings and pain control.

## 2023-06-13 NOTE — ED CDU PROVIDER INITIAL DAY NOTE - CLINICAL SUMMARY MEDICAL DECISION MAKING FREE TEXT BOX
67F p/w R flank pain x 1 day, worsening, radiating to abdomen. In ED, CT revealed "A 2.9 cm hypodense collection within the right iliacus musculature   suggests iliopsoas bursitis.  Contrast-enhanced MRI can be performed for further evaluation if clinically indicated.", slight leukocytosis of 11k. Pt accepted to CDU for paion control and MRI to further characterize CT findings.

## 2023-06-14 VITALS
OXYGEN SATURATION: 96 % | RESPIRATION RATE: 18 BRPM | SYSTOLIC BLOOD PRESSURE: 138 MMHG | TEMPERATURE: 98 F | DIASTOLIC BLOOD PRESSURE: 79 MMHG | HEART RATE: 72 BPM

## 2023-06-14 LAB
CULTURE RESULTS: SIGNIFICANT CHANGE UP
SPECIMEN SOURCE: SIGNIFICANT CHANGE UP

## 2023-06-14 PROCEDURE — 72170 X-RAY EXAM OF PELVIS: CPT | Mod: 26

## 2023-06-14 PROCEDURE — 73552 X-RAY EXAM OF FEMUR 2/>: CPT | Mod: 26,RT

## 2023-06-14 PROCEDURE — 99253 IP/OBS CNSLTJ NEW/EST LOW 45: CPT

## 2023-06-14 RX ORDER — MAGNESIUM HYDROXIDE 400 MG/1
30 TABLET, CHEWABLE ORAL ONCE
Refills: 0 | Status: COMPLETED | OUTPATIENT
Start: 2023-06-14 | End: 2023-06-14

## 2023-06-14 RX ADMIN — Medication 650 MILLIGRAM(S): at 00:30

## 2023-06-14 RX ADMIN — Medication 650 MILLIGRAM(S): at 09:00

## 2023-06-14 RX ADMIN — LOSARTAN POTASSIUM 100 MILLIGRAM(S): 100 TABLET, FILM COATED ORAL at 06:10

## 2023-06-14 RX ADMIN — Medication 650 MILLIGRAM(S): at 07:51

## 2023-06-14 RX ADMIN — MAGNESIUM HYDROXIDE 30 MILLILITER(S): 400 TABLET, CHEWABLE ORAL at 13:07

## 2023-06-14 RX ADMIN — Medication 15 MILLIGRAM(S): at 16:45

## 2023-06-14 RX ADMIN — Medication 15 MILLIGRAM(S): at 17:02

## 2023-06-14 NOTE — ED CDU PROVIDER SUBSEQUENT DAY NOTE - ATTENDING APP SHARED VISIT CONTRIBUTION OF CARE
Attending Statement: I have reviewed and agree with all pertinent clinical information, including history and physical exam and agree with treatment plan of the PA, except as noted.  States she still having discomfort over the right flank this morning.  No numbness no weakness pain is constant, worse with movement, nonradiating.  No fever no chills.  MRI done impression mass splaying that iliopsoas tendon on the right with heterogeneous enhancement peripheral enhancement and a tail of the cranial and caudal fat.  Given patient still uncomfortable endorsing pain will consult orthopedic for further recommendations.  Continue pain meds as needed and will consult orthopedic.  Patient aware of results and plan.[id number] Attending Statement: I have reviewed and agree with all pertinent clinical information, including history and physical exam and agree with treatment plan of the PA, except as noted.  States she still having discomfort over the right flank this morning.  No numbness no weakness pain is constant, worse with movement, nonradiating.  No fever no chills.  MRI done impression mass splaying that iliopsoas tendon on the right with heterogeneous enhancement peripheral enhancement and a tail of the cranial and caudal fat.  Given patient still uncomfortable endorsing pain will consult orthopedic for further recommendations.  Continue pain meds as needed and will consult orthopedic.  Patient aware of results and plan.

## 2023-06-14 NOTE — CONSULT NOTE ADULT - SUBJECTIVE AND OBJECTIVE BOX
**INCOMPLETE NOTE**    Orthopedics Consult Note:  62y Female with PMH of HTN, HLD presents to [facility] with c/o R flank pain x 3 days. Pt states she has worsening right low back pain and flank pain, slightly radiating around the front. She denies any hip pain or difficulty ambulating. Endorses fall 2 weeks ago for which she has followed up with an orthopedist (Dr. Fredy Farmer) who has her on a physical therapy regimen for her knees. No acute injury prior to arrival. No recent illness. Denies allergies. 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  Pt denies fevers/chills, headstrike or LOC, numbness/tingling or any other orthopedic pain/injury.     PAST MEDICAL & SURGICAL HISTORY:  HTN (hypertension)      HLD (hyperlipidemia)      No significant past surgical history        MEDICATIONS  (STANDING):  atorvastatin 5 milliGRAM(s) Oral at bedtime  hydrochlorothiazide 12.5 milliGRAM(s) Oral daily  losartan 100 milliGRAM(s) Oral daily    Allergies    No Known Allergies    Intolerances        Vital Signs Last 24 Hrs  T(C): 36.9 (06-14-23 @ 10:10), Max: 37.1 (06-13-23 @ 14:09)  T(F): 98.4 (06-14-23 @ 10:10), Max: 98.8 (06-13-23 @ 14:09)  HR: 65 (06-14-23 @ 10:10) (65 - 89)  BP: 136/78 (06-14-23 @ 10:10) (116/55 - 142/68)  BP(mean): --  RR: 17 (06-14-23 @ 10:10) (17 - 18)  SpO2: 97% (06-14-23 @ 10:10) (97% - 100%)                          13.1   8.74  )-----------( 204      ( 13 Jun 2023 11:42 )             40.6     13 Jun 2023 11:42    140    |  99     |  14     ----------------------------<  125    3.2     |  28     |  0.89     Ca    8.9        13 Jun 2023 11:42    TPro  7.7    /  Alb  4.5    /  TBili  0.4    /  DBili  x      /  AST  19     /  ALT  22     /  AlkPhos  73     12 Jun 2023 18:22          Physical Exam:  General: NAD, alert and oriented  Head: NCAT without abrasions, lacerations, or ecchymosis to head, face, or scalp    HIPS and PELVIS: Able to SLR R leg against resistance     RIGHT LE:   No open skin.   No deformities or other signs of trauma at hip, knee, lower leg, ankle or foot.   Full baseline painless ROM at ankle and toes.  Negative log-roll and heel strike.   Able to actively SLR, against resistance, without pain.  No tenderness to palpation over anterior hip joint and thigh   No tenderness to palpation over right flank, lumber spine, SI joint    Sensation intact to light touch distally, DP 1+  Compartments soft and compressible.     LEFT LE:   No open skin.   No deformities or other signs of trauma at hip, knee, lower leg, ankle or foot.   Healing bruise to anterior inferior aspect of knee over patellar tendon, non tender to palpation   Full baseline painless ROM at ankle and toes.  Negative log-roll and heel strike.   Able to actively SLR.   Sensation intact to light touch distally, DP 1+   Compartments are soft and compressible.     BL UE:   No open skin.   No obvious deformities or other signs of trauma at shoulder, upper arm, elbow, forearm, wrist or hand.    Full baseline painless ROM at shoulder, elbow, wrist, and . No bony TTP.   Compartments soft and compressible.  strength symmetric.    Imaging:    Pending XR pelvis, R femur: ***    CT abdomen pelvis:   < from: CT Abdomen and Pelvis No Cont (06.12.23 @ 21:31) >  IMPRESSION:  No hydronephrosis or obstructing renal stone. A 3 mm nonobstructive left   renal stone.    A 2.9 cm hypodense collection within the right iliacus musculature   suggests iliopsoas bursitis.  Contrast-enhanced MRI can be performed for   further evaluation if clinically indicated.    --------------------------- End of Report ---------------------------------------      MRI:   < from: MR Pelvis Bony Only w/wo IV Cont (06.13.23 @ 18:20) >  MPRESSION:  Mass splaying the iliopsoas tendon on the right with heterogeneous   enhancement, peripheral enhancement, and a tail of cranial and caudal   fat. Differential considerations include a complex loculated bursal fluid   collection, cystic peripheral nerve sheath tumor and atypical lipomatous   tumor.    --------------------------- End of Report ---------------------------------------    ROXANN BROOKS MD; Attending Radiologist  This document has been electronically signed. Jun 13 2023  7:10PM    OMARI ROSALES MD; Resident Radiologist  This document has been electronically signed.  DIANA MEJIA MD; Attending Radiologist  This document has been electronically signed. Jun 12 2023 11:22PM    < end of copied text >   Orthopedics Consult Note:  62y Female with PMH of HTN, HLD presents to [facility] with c/o R flank pain x 3 days. Pt states she has worsening right low back pain and flank pain, slightly radiating around the front. She denies any hip pain or difficulty ambulating. Endorses fall 2 weeks ago for which she has followed up with an orthopedist (Dr. Fredy Farmer) who has her on a physical therapy regimen for her knees. No acute injury prior to arrival. No recent illness. Denies allergies. 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  Pt denies fevers/chills, headstrike or LOC, numbness/tingling or any other orthopedic pain/injury.     PAST MEDICAL & SURGICAL HISTORY:  HTN (hypertension)      HLD (hyperlipidemia)      No significant past surgical history        MEDICATIONS  (STANDING):  atorvastatin 5 milliGRAM(s) Oral at bedtime  hydrochlorothiazide 12.5 milliGRAM(s) Oral daily  losartan 100 milliGRAM(s) Oral daily    Allergies    No Known Allergies    Intolerances        Vital Signs Last 24 Hrs  T(C): 36.9 (06-14-23 @ 10:10), Max: 37.1 (06-13-23 @ 14:09)  T(F): 98.4 (06-14-23 @ 10:10), Max: 98.8 (06-13-23 @ 14:09)  HR: 65 (06-14-23 @ 10:10) (65 - 89)  BP: 136/78 (06-14-23 @ 10:10) (116/55 - 142/68)  BP(mean): --  RR: 17 (06-14-23 @ 10:10) (17 - 18)  SpO2: 97% (06-14-23 @ 10:10) (97% - 100%)                          13.1   8.74  )-----------( 204      ( 13 Jun 2023 11:42 )             40.6     13 Jun 2023 11:42    140    |  99     |  14     ----------------------------<  125    3.2     |  28     |  0.89     Ca    8.9        13 Jun 2023 11:42    TPro  7.7    /  Alb  4.5    /  TBili  0.4    /  DBili  x      /  AST  19     /  ALT  22     /  AlkPhos  73     12 Jun 2023 18:22          Physical Exam:  General: NAD, alert and oriented  Head: NCAT without abrasions, lacerations, or ecchymosis to head, face, or scalp    HIPS and PELVIS: Able to SLR R leg against resistance     RIGHT LE:   No open skin.   No deformities or other signs of trauma at hip, knee, lower leg, ankle or foot.   Full baseline painless ROM at ankle and toes.  Negative log-roll and heel strike.   Able to actively SLR, against resistance, without pain.  No tenderness to palpation over anterior hip joint and thigh   No tenderness to palpation over right flank, lumber spine, SI joint    Sensation intact to light touch distally, DP 1+  Compartments soft and compressible.     LEFT LE:   No open skin.   No deformities or other signs of trauma at hip, knee, lower leg, ankle or foot.   Healing bruise to anterior inferior aspect of knee over patellar tendon, non tender to palpation   Full baseline painless ROM at ankle and toes.  Negative log-roll and heel strike.   Able to actively SLR.   Sensation intact to light touch distally, DP 1+   Compartments are soft and compressible.     BL UE:   No open skin.   No obvious deformities or other signs of trauma at shoulder, upper arm, elbow, forearm, wrist or hand.    Full baseline painless ROM at shoulder, elbow, wrist, and . No bony TTP.   Compartments soft and compressible.  strength symmetric.    Imaging:    Pending XR pelvis, R femur: no acute bony abnormality noted (my read)    CT abdomen pelvis:   < from: CT Abdomen and Pelvis No Cont (06.12.23 @ 21:31) >  IMPRESSION:  No hydronephrosis or obstructing renal stone. A 3 mm nonobstructive left   renal stone.    A 2.9 cm hypodense collection within the right iliacus musculature   suggests iliopsoas bursitis.  Contrast-enhanced MRI can be performed for   further evaluation if clinically indicated.    --------------------------- End of Report ---------------------------------------      MRI:   < from: MR Pelvis Bony Only w/wo IV Cont (06.13.23 @ 18:20) >  MPRESSION:  Mass splaying the iliopsoas tendon on the right with heterogeneous   enhancement, peripheral enhancement, and a tail of cranial and caudal   fat. Differential considerations include a complex loculated bursal fluid   collection, cystic peripheral nerve sheath tumor and atypical lipomatous   tumor.    --------------------------- End of Report ---------------------------------------    ROXANN BROOKS MD; Attending Radiologist  This document has been electronically signed. Jun 13 2023  7:10PM    OMARI ROSALES MD; Resident Radiologist  This document has been electronically signed.  DIANA MEJIA MD; Attending Radiologist  This document has been electronically signed. Jun 12 2023 11:22PM    < end of copied text >

## 2023-06-14 NOTE — ED CDU PROVIDER SUBSEQUENT DAY NOTE - HISTORY
67F p/w R flank pain x 1 day, worsening, radiating to abdomen. In ED, CT revealed "A 2.9 cm hypodense collection within the right iliacus musculature suggests iliopsoas bursitis.  Contrast-enhanced MRI can be performed for further evaluation if clinically indicated.", slight leukocytosis of 11k. Pt accepted to CDU for pain control and MRI to further characterize CT findings.  MRI shows Mass splaying the iliopsoas tendon on the right with heterogeneous enhancement, peripheral enhancement, and a tail of cranial and caudal fat. Differential considerations include a complex loculated bursal fluid collection, cystic peripheral nerve sheath tumor and atypical lipomatous tumor.  Pain controlled at this time. Pt ambulatory. Will likely need outpatient follow up regarding MRI.

## 2023-06-14 NOTE — CONSULT NOTE ADULT - ASSESSMENT
**INCOMPLETE NOTE**    A/P: 62y Female with R iliopsoas tendon mass   - Pain control  - FU XR pelvis, R femur   - Plan to be discussed with orthopedic attending on-call, Dr. Leigh A/P: 62y Female with R iliopsoas tendon mass   - Pain control  - Plan to be discussed with orthopedic attending on-call, Dr. Murray, who recommends outpatient follow up with him in 1-2 weeks   - No further work up indicated while inpatient  - Stable for discharge from orthopedic standpoint  - WBAT RLE

## 2023-06-14 NOTE — CONSULT NOTE ADULT - ATTENDING COMMENTS
I have reviewed and agree with note as written above  62 F with on and off pain, and incidentally found lesion in the distal R iliopsoas tendon. Pain with resisted flexion  No acute needed for intervention.  All imaging and exam reviewed  She is stable for discharge from ortho POV and can f/u with me in the office for further management of small soft tissue mass  Weight bearing as tolerated.  Venkata Murray MD  Musculoskeletal Oncology  [phone] I have reviewed and agree with note as written above  consult at 6/14/2023 2pm  62 F with on and off pain, and incidentally found lesion in the distal R iliopsoas tendon. Pain with resisted flexion  No acute needed for intervention.  All imaging and exam reviewed  She is stable for discharge from ortho POV and can f/u with me in the office for further management of small soft tissue mass  Weight bearing as tolerated.  Venkata Murray MD  Musculoskeletal Oncology  [phone]

## 2023-06-14 NOTE — ED CDU PROVIDER SUBSEQUENT DAY NOTE - PROGRESS NOTE DETAILS
The patient will not be done with MAC due to hyperkalemia 6.3. The patient opted to proceed with the colonoscopy without sedation.     Bar Ferrer MD, D. ESPERANZA       Patient placed in CDU overnight.  Endorsing pain to the right flank.  No nausea no vomiting.  Has no history of trauma.  Not on anticoagulation.  Nontoxic female with tenderness to the right paralumbar point flank area no overlying redness no ecchymosis no fluctuance.  Pending MRI to be done and repeat blood work this afternoon. pt denies any metal or implants. Denies being claustrophobic pending MRI to be done. Received call from Dr. Washington Gallagher.  Case discussed with Dr. Gallagher who recommends discontinuing MRI abdomen.  Discussed case with Dr. Kelby Modi to protocol MRI.  He recommends changing MRI order to MR pelvis bony only w and w/o. Discussed MRI with ortho, requesting xray pelvis and femur, will see pt in the CDU. Xray with mild osteopenia, ortho recommending weight bear as tolerate, follow up within 1-2 weeks with ortho onc. Discussed all results with pt, she will also f/u with her PMD. At times of discharge pt is well appearing, vitals stable, discharge discussed with CDU attending.

## 2023-06-15 PROBLEM — E78.5 HYPERLIPIDEMIA, UNSPECIFIED: Chronic | Status: ACTIVE | Noted: 2023-06-13

## 2023-06-22 ENCOUNTER — APPOINTMENT (OUTPATIENT)
Dept: ORTHOPEDIC SURGERY | Facility: CLINIC | Age: 63
End: 2023-06-22
Payer: COMMERCIAL

## 2023-06-22 VITALS
HEART RATE: 74 BPM | OXYGEN SATURATION: 98 % | DIASTOLIC BLOOD PRESSURE: 89 MMHG | WEIGHT: 193 LBS | SYSTOLIC BLOOD PRESSURE: 142 MMHG | HEIGHT: 67 IN | BODY MASS INDEX: 30.29 KG/M2

## 2023-06-22 PROCEDURE — 99213 OFFICE O/P EST LOW 20 MIN: CPT

## 2023-06-22 PROCEDURE — 76882 US LMTD JT/FCL EVL NVASC XTR: CPT

## 2023-06-22 NOTE — PHYSICAL EXAM
[FreeTextEntry1] : On exam patient stands in good balance.  She is able to walk appropriately.  She has minimal pain with hip flexion.  She has general aching throughout the quad and lower.  She is stable with all motion.  Her hip goes to 100 degrees of flexion with normal internal and external rotation abduction.  She has a palpable mass under the psoas that goes back and forth into the tendon.  There is no Tinel's associated with it.  She has normal sensation anterior thigh lateral and posterior [General Appearance - Well-Appearing] : Well appearing [General Appearance - Well Nourished] : well nourished [Oriented To Time, Place, And Person] : Oriented to person, place, and time [Impaired Insight] : Insight and judgment were intact [Affect] : The affect was normal. [Mood] : the mood was normal [Sclera] : the sclera and conjunctiva were normal [Neck Cervical Mass (___cm)] : no neck mass was observed [Heart Rate And Rhythm] : heart rate was normal and rhythm regular [] : No respiratory distress [Abdomen Soft] : Soft [Normal Station and Gait] : gait and station were normal [Tenderness] : no tenderness [Swelling] : no swelling [Skin Changes - Describe changes:] : No skin changes noted [Full ROM Unless otherwise noted:] : Full range of motion unless otherwise noted: [LE  Motor Strength Normal unless otherwise noted:] : 5/5 strength in bilateral lower extemities unless otherwise noted. [Normal] : Sensation intact to light touch.

## 2023-06-22 NOTE — DATA REVIEWED
[Imaging Present] : Present [de-identified] : Focused ultrasound of the area does show a somewhat complex but cystic mass in the area of the psoas on the deep portion of it.  There is no flow in it.\par \par X-rays from the hospital from last week of the pelvis and the and femur show some degenerative spine changes as well as some cystic minimally erosion at the superior acetabular rim of the right hip. \par \par MRI pelvis from 6/13/2023 with and without contrast shows:\par \par IMPRESSION:\par Mass splaying the iliopsoas tendon on the right with heterogeneous enhancement, peripheral enhancement, and a tail of cranial and caudal fat. Differential considerations include a complex loculated bursal fluid collection, cystic peripheral nerve sheath tumor and atypical lipomatous tumor.\par \par While not reported this looks consistent with a paralabral cyst from the degenerative labrum.  This is on exam the patient stands in good balance.  She has no pain in the

## 2023-06-22 NOTE — HISTORY OF PRESENT ILLNESS
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  She had a work-up and was found to have a lesion along the area of the psoas.  She was then sent to me for further evaluation.She still has occasional pain in the area of the knee with minimal pain in the hip.  She does not know of any mass.  She has no numbness or tingling in the thigh anteriorly or laterally.  Her back/flank pain got better. [Stable] : stable [1] : currently ~his/her~ pain is 1 out of 10

## 2023-06-22 NOTE — DISCUSSION/SUMMARY
[Surgical risks reviewed] : Surgical risks reviewed [All Questions Answered] : Patient (and family) had all questions answered to an agreeable level of satisfaction [Interested in Proceeding] : Patient (and family) expressed understanding and interest in proceeding with the plan as outlined [de-identified] : Patient is a soft tissue lesion in the area of the psoas.  I think this is most likely either a cyst or possibly schwannoma.  Because on the ultrasound it looks a little bit more complex I think this is more of a septated cyst coming from the joint.  She also has no nerve symptoms associated with it and that this could be in the area of the lateral femoral cutaneous nerve or femoral nerve.  Regardless I would continue watching this and do a ultrasound again in 2 to 3 months.  Should she have more pain or increased she can come back sooner.\par \par If imaging was ordered, the patient was told to make an appointment to review findings right after all imaging is completed.\par \par We discussed risks, benefits and alternatives. Rationale of care was reviewed and all questions were answered. Patient (and family) had all questions answered to her degree of the level of satisfaction. Patient (and family) expressed understanding and interest in proceeding with the plan as outlined.\par \par \par \par \par This note was done with a voice recognition transcription software and any typos are related to this rather than medical error. Surgical risks reviewed. Patient (and family) had all questions answered to an agreeable level of satisfaction. Patient (and family) expressed understanding and interest in proceeding with the plan as outlined.  \par

## 2023-06-23 ENCOUNTER — RX RENEWAL (OUTPATIENT)
Age: 63
End: 2023-06-23

## 2023-06-26 ENCOUNTER — APPOINTMENT (OUTPATIENT)
Dept: GASTROENTEROLOGY | Facility: CLINIC | Age: 63
End: 2023-06-26
Payer: COMMERCIAL

## 2023-06-26 VITALS
SYSTOLIC BLOOD PRESSURE: 119 MMHG | DIASTOLIC BLOOD PRESSURE: 72 MMHG | WEIGHT: 189 LBS | HEIGHT: 67 IN | BODY MASS INDEX: 29.66 KG/M2 | HEART RATE: 76 BPM

## 2023-06-26 DIAGNOSIS — Z12.11 ENCOUNTER FOR SCREENING FOR MALIGNANT NEOPLASM OF COLON: ICD-10-CM

## 2023-06-26 PROCEDURE — 99203 OFFICE O/P NEW LOW 30 MIN: CPT

## 2023-06-26 RX ORDER — KETOCONAZOLE 20 MG/G
2 CREAM TOPICAL
Qty: 60 | Refills: 0 | Status: DISCONTINUED | COMMUNITY
Start: 2023-05-09

## 2023-06-26 NOTE — ASSESSMENT
[FreeTextEntry1] : 1.  Encounter for surveillance colonoscopy.  Previous colonoscopy in 2017 with removal of a polyp.\par 2.  Right flank pain.  CT A/P in June 2023 with possible right iliopsoas bursitis.\par 3.  HTN.\par 4.  HLD.\par 5.  Smoker.\par \par Recs:\par - ER labs reviewed.\par - Patient was advised to undergo colonoscopy- procedure, risks, benefits, and alternatives were explained. Patient agreeable. Brochure given. PEG prep.

## 2023-06-26 NOTE — REVIEW OF SYSTEMS
[Constipation] : constipation [Arthralgias (joint pain)] : arthralgias [Muscle Weakness] : muscle weakness [Negative] : Heme/Lymph [As Noted in HPI] : as noted in HPI

## 2023-07-05 ENCOUNTER — APPOINTMENT (OUTPATIENT)
Dept: ORTHOPEDIC SURGERY | Facility: CLINIC | Age: 63
End: 2023-07-05
Payer: COMMERCIAL

## 2023-07-05 VITALS — BODY MASS INDEX: 30.29 KG/M2 | WEIGHT: 193 LBS | HEIGHT: 67 IN

## 2023-07-05 PROCEDURE — 99214 OFFICE O/P EST MOD 30 MIN: CPT

## 2023-07-05 NOTE — PHYSICAL EXAM
[de-identified] : Constitutional\par o Appearance : well-nourished, well developed, alert, in no acute distress \par Head and Face\par o Head :\par ¦ Inspection : atraumatic, normocephalic\par o Face :\par ¦ Inspection : no visible rash or discoloration\par Respiratory\par o Respiratory Effort: breathing unlabored \par Neurologic\par o Mental Status Examination :\par ¦ Orientation : alert and oriented X 3\par Psychiatric\par o Mood and Affect: mood normal, affect appropriate\par Cardiovascular\par o Observation/Palpation : - no swelling\par Lymphatic\par o Additional Nodes : No palpable lymph nodes present\par \par Right Lower Extremity\par o Buttock : no tenderness, swelling or deformities \par o Right Hip :\par ¦ Inspection/Palpation : no greater trochanteric or ITB tenderness, swelling or deformities\par ¦ Range of Motion : full and painless in all planes, no crepitance\par ¦ Stability : joint stability intact\par ¦ Strength : extension, flexion 3/5, adduction, abduction, internal rotation and external rotation\par \par o Right Knee :\par ¦ Inspection/Palpation : no medial or lateral compartment tenderness, no medial epicondylar tenderness, medial and lateral facet tenderness of the patella, mild puffiness\par ¦ Range of Motion : 0-135°, pain on full extension and flexion, decreased patellofemoral glide \par ¦ Stability : no valgus or varus instability present on provocative testing\par ¦ Strength : flexion and extension 3+/5\par ¦ Tests and Signs : negative Anterior Drawer, negative Lachman, negative Richard\par \par Left Lower Extremity\par o Buttock : no tenderness, swelling or deformities \par o Left Hip :\par ¦ Inspection/Palpation : no tenderness, no swelling or deformities\par ¦ Range of Motion : full and painless in all planes, no crepitance\par ¦ Stability : joint stability intact\par ¦ Strength : extension, flexion, adduction, abduction, internal rotation and external rotation, 5/5\par \par o Left Knee :\par ¦ Inspection/Palpation : no tenderness to palpation, no swelling\par ¦ Range of Motion : 0-135° painless, no crepitance\par ¦ Stability : no valgus or varus instability present on provocative testing\par ¦ Strength : flexion and extension 5/5\par ¦ Tests and Signs : negative Anterior Drawer, negative Lachman, negative Richard\par \par Gait and Station:\par Gait: valgus deformity, walks with a stiffened gait on the right, no significant extremity swelling or lymphedema, good proprioception and balance, no foot drop\par \par

## 2023-07-05 NOTE — ADDENDUM
[FreeTextEntry1] : I, LUISBRANDON MOJICA, acted solely as a scribe for Dr. Fredy Farmer on this date 07/05/2023.\par \par All medical record entries made by the Scribe were at my, Dr. Fredy Farmer, direction and personally dictated by me on 07/05/2023. I have reviewed the chart and agree that the record accurately reflects my personal performance of the history, physical exam, assessment and plan. I have also personally directed, reviewed, and agreed with the chart.

## 2023-07-05 NOTE — HISTORY OF PRESENT ILLNESS
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 She had fallen in April when she missed a step while taking out garbage. She notes that she started PT and was doing fairly well. She developed right-sided mid back pain on 06/12/23, which was severe. There was no injury. She went to [facility] ER and was admitted until 06/14/23. A CT scan of the abdomen, as well as xrays of the hip and femur were done. She was told that there was a mass in the right groin. She followed up with Dr. Murray on 06/22/23 who did an ultrasound. She was told that it might be related to bursitis and she should follow up in August. Her right hip bothers her intermittently. It worsens when she is on her feet for long periods of time. Her knee is still bothering her, as well. She would like to get back to PT. \par PMHx of HTN, HLD.\par \par CT abdomen obtained at [facility] on 06/12/23 revealed:\par 1. No hydronephrosis or obstructing renal stone. A 3 mm nonobstructive left renal stone.\par 2. A 2.9 cm hypodense collection within the right iliacus musculature suggests iliopsoas bursitis. Contrast-enhanced MRI can be performed for further evaluation if clinically indicated.\par \par MRI of the Pelvis obtained at [facility] on 06/13/23 revealed:\par 1. Mass splaying the iliopsoas tendon on the right with heterogeneous enhancement, peripheral enhancement, and a tail of cranial and caudal fat. Differential considerations include a complex loculated bursal fluid collection, cystic peripheral nerve sheath tumor and atypical lipomatous tumor.\par \par XRay of the RIGHT HIP and FEMUR obtained at [facility] on 06/14/23 revealed:\par 1. No hip fractures or dislocations.\par 2. Intact pelvic and obturator rings and symmetrically aligned and spaced SI joints and pubic symphysis.\par 3. Lower lumbar spine degenerative change again apparent. Hypertrophic degenerative rim ossification along lateral right acetabular articular margin otherwise preserved bilateral hip and right knee joint spaces. No gross radiographic evidence for AVN.\par 4. Superior patellar enthesophyte.\par 5. Generalized mild osteopenia otherwise no discrete suspicious lytic or blastic lesions.\par \par Radiology Results from 5/24/23:\par o Right Knee : Standing AP, lateral, tunnel, and skyline views of the knee were obtained and revealed moderate lateral and patellofemoral arthritis. No lytic lesions or calcifications. \par o Right Tib/Fib : AP and lateral views were obtained and reveal no fracture of the tibia. No significant degenerative arthritis of the visualized ankle.\par \par 5/15/2023 RIGHT knee X-rays in [facility] (not [facility]) show no fxs with a traction spur of the patella. RIGHT foot x-rays reveal no fracture or dislocation.

## 2023-07-05 NOTE — DISCUSSION/SUMMARY
[de-identified] : I discussed the underlying pathophysiology of the patient's condition in great detail with the patient. I went over the patient's x-rays with them in great detail. At this time, she will start a course of physical therapy for strengthening and flexibility. A prescription was provided. Viscosupplementation was discussed as a solution to the patient's symptoms. At-home strengthening exercises were discussed and demonstrated with the patient. We discussed the use of ice, Tylenol and anti-inflammatories to relieve pain. \par \par All of their questions were answered. They understand and consent to the plan. \par \par FU in one month

## 2023-07-28 ENCOUNTER — APPOINTMENT (OUTPATIENT)
Dept: GASTROENTEROLOGY | Facility: CLINIC | Age: 63
End: 2023-07-28
Payer: COMMERCIAL

## 2023-07-28 ENCOUNTER — LABORATORY RESULT (OUTPATIENT)
Age: 63
End: 2023-07-28

## 2023-07-28 PROCEDURE — 45380 COLONOSCOPY AND BIOPSY: CPT

## 2023-08-07 ENCOUNTER — APPOINTMENT (OUTPATIENT)
Dept: ORTHOPEDIC SURGERY | Facility: CLINIC | Age: 63
End: 2023-08-07
Payer: COMMERCIAL

## 2023-08-07 PROCEDURE — 20610 DRAIN/INJ JOINT/BURSA W/O US: CPT | Mod: RT

## 2023-08-07 PROCEDURE — 99214 OFFICE O/P EST MOD 30 MIN: CPT | Mod: 25

## 2023-08-07 NOTE — PHYSICAL EXAM
[de-identified] : Constitutional o Appearance : well-nourished, well developed, alert, in no acute distress  Head and Face o Head :  Inspection : atraumatic, normocephalic o Face :  Inspection : no visible rash or discoloration Respiratory o Respiratory Effort: breathing unlabored  Neurologic o Mental Status Examination :  Orientation : alert and oriented X 3 Psychiatric o Mood and Affect: mood normal, affect appropriate Cardiovascular o Observation/Palpation : - no swelling Lymphatic o Additional Nodes : No palpable lymph nodes present  Right Lower Extremity o Buttock : no tenderness, swelling or deformities  o Right Hip :  Inspection/Palpation : no greater trochanteric or ITB tenderness, swelling or deformities  Range of Motion : full and painless in all planes, no crepitance  Stability : joint stability intact  Strength : extension, flexion 3/5, adduction, abduction, internal rotation and external rotation  o Right Knee :  Inspection/Palpation : no medial or lateral compartment tenderness, no medial epicondylar tenderness, medial and lateral facet tenderness of the patella,SLR, tight hamstring, no swelling,   Range of Motion : 0-135, pain on full extension and flexion, decreased patellofemoral glide   Stability : no valgus or varus instability present on provocative testing  Strength : flexion and extension 4+/5  Tests and Signs : negative Anterior Drawer, negative Lachman, negative Richard  Left Lower Extremity o Buttock : no tenderness, swelling or deformities  o Left Hip :  Inspection/Palpation : no tenderness, no swelling or deformities  Range of Motion : full and painless in all planes, no crepitance  Stability : joint stability intact  Strength : extension, flexion, adduction, abduction, internal rotation and external rotation, 5/5  o Left Knee :  Inspection/Palpation : no tenderness to palpation, no swelling  Range of Motion : 0-135 painless, no crepitance  Stability : no valgus or varus instability present on provocative testing  Strength : flexion and extension 5/5  Tests and Signs : negative Anterior Drawer, negative Lachman, negative Richard  Gait and Station: Gait: valgus deformity, walks with a stiffened gait on the right, no significant extremity swelling or lymphedema, good proprioception and balance, no foot drop  o Knee injection: Indication-right knee osteoarthritis, Anatomic location- right intra-articular joint space, Spray - area was sterilized with Betadine and alcohol and anesthetized with Ethyl Chloride, needle used-20G, Medications given- 5cc's lidocaine, 0.5cc's Kenalog, 0.5 cc's dexamethasone. The patient tolerated the procedure well.

## 2023-08-07 NOTE — DISCUSSION/SUMMARY
[de-identified] : I went over the pathophysiology of the patient's symptoms in great detail with the patient. The patient elected to receive a cortisone injection into her right knee today and tolerated it well. I instructed the patient on ROM exercises and told them to take it easy. The use of ice and rest was reviewed with the patient. The patient may resume activities tomorrow. We recommended she stops formal PT at this time. At-home strengthening exercises were discussed and demonstrated with the patient.   All of their questions were answered. They understand and consent to the plan.   FU in one month. After receiving a right knee cortisone injection today 08/07/2023. If she does not improve we will request an MRI of her right knee.

## 2023-08-07 NOTE — HISTORY OF PRESENT ILLNESS
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 She has been attending formal PT. She is unsure if she is making progress in PT. She continues to have right knee and lower leg pain. Patient states that she has difficulty when descending stairs. She notes bending her right knee back is painful. She denies any swelling or buckling.  PMHx of HTN, HLD.  CT abdomen obtained at [facility] on 06/12/23 revealed: 1. No hydronephrosis or obstructing renal stone. A 3 mm nonobstructive left renal stone. 2. A 2.9 cm hypodense collection within the right iliacus musculature suggests iliopsoas bursitis. Contrast-enhanced MRI can be performed for further evaluation if clinically indicated.  MRI of the Pelvis obtained at [facility] on 06/13/23 revealed: 1. Mass splaying the iliopsoas tendon on the right with heterogeneous enhancement, peripheral enhancement, and a tail of cranial and caudal fat. Differential considerations include a complex loculated bursal fluid collection, cystic peripheral nerve sheath tumor and atypical lipomatous tumor.  XRay of the RIGHT HIP and FEMUR obtained at [facility] on 06/14/23 revealed: 1. No hip fractures or dislocations. 2. Intact pelvic and obturator rings and symmetrically aligned and spaced SI joints and pubic symphysis. 3. Lower lumbar spine degenerative change again apparent. Hypertrophic degenerative rim ossification along lateral right acetabular articular margin otherwise preserved bilateral hip and right knee joint spaces. No gross radiographic evidence for AVN. 4. Superior patellar enthesophyte. 5. Generalized mild osteopenia otherwise no discrete suspicious lytic or blastic lesions.  Radiology Results from 5/24/23: o Right Knee : Standing AP, lateral, tunnel, and skyline views of the knee were obtained and revealed moderate lateral and patellofemoral arthritis. No lytic lesions or calcifications.  o Right Tib/Fib : AP and lateral views were obtained and reveal no fracture of the tibia. No significant degenerative arthritis of the visualized ankle.  5/15/2023 RIGHT knee X-rays in [facility] (not [facility]) show no fxs with a traction spur of the patella. RIGHT foot x-rays reveal no fracture or dislocation.

## 2023-09-21 ENCOUNTER — APPOINTMENT (OUTPATIENT)
Dept: ORTHOPEDIC SURGERY | Facility: CLINIC | Age: 63
End: 2023-09-21
Payer: COMMERCIAL

## 2023-09-21 DIAGNOSIS — M23.91 UNSPECIFIED INTERNAL DERANGEMENT OF RIGHT KNEE: ICD-10-CM

## 2023-09-21 PROCEDURE — 99214 OFFICE O/P EST MOD 30 MIN: CPT

## 2023-11-07 ENCOUNTER — APPOINTMENT (OUTPATIENT)
Dept: ORTHOPEDIC SURGERY | Facility: CLINIC | Age: 63
End: 2023-11-07
Payer: COMMERCIAL

## 2023-11-07 PROCEDURE — 99213 OFFICE O/P EST LOW 20 MIN: CPT

## 2023-11-07 PROCEDURE — 76882 US LMTD JT/FCL EVL NVASC XTR: CPT

## 2023-12-04 ENCOUNTER — APPOINTMENT (OUTPATIENT)
Dept: ORTHOPEDIC SURGERY | Facility: CLINIC | Age: 63
End: 2023-12-04
Payer: COMMERCIAL

## 2023-12-04 PROCEDURE — 99213 OFFICE O/P EST LOW 20 MIN: CPT

## 2024-01-05 ENCOUNTER — RX RENEWAL (OUTPATIENT)
Age: 64
End: 2024-01-05

## 2024-01-31 ENCOUNTER — RX RENEWAL (OUTPATIENT)
Age: 64
End: 2024-01-31

## 2024-01-31 RX ORDER — LOSARTAN POTASSIUM AND HYDROCHLOROTHIAZIDE 12.5; 1 MG/1; MG/1
100-12.5 TABLET ORAL
Qty: 30 | Refills: 2 | Status: ACTIVE | COMMUNITY
Start: 2023-04-25 | End: 1900-01-01

## 2024-03-01 ENCOUNTER — NON-APPOINTMENT (OUTPATIENT)
Age: 64
End: 2024-03-01

## 2024-03-01 ENCOUNTER — APPOINTMENT (OUTPATIENT)
Dept: INTERNAL MEDICINE | Facility: CLINIC | Age: 64
End: 2024-03-01
Payer: COMMERCIAL

## 2024-03-01 VITALS
OXYGEN SATURATION: 98 % | HEIGHT: 67 IN | HEART RATE: 70 BPM | WEIGHT: 183 LBS | DIASTOLIC BLOOD PRESSURE: 77 MMHG | SYSTOLIC BLOOD PRESSURE: 126 MMHG | BODY MASS INDEX: 28.72 KG/M2

## 2024-03-01 DIAGNOSIS — Z00.00 ENCOUNTER FOR GENERAL ADULT MEDICAL EXAMINATION W/OUT ABNORMAL FINDINGS: ICD-10-CM

## 2024-03-01 DIAGNOSIS — M25.561 PAIN IN RIGHT KNEE: ICD-10-CM

## 2024-03-01 PROCEDURE — 99397 PER PM REEVAL EST PAT 65+ YR: CPT | Mod: 25

## 2024-03-01 PROCEDURE — 36415 COLL VENOUS BLD VENIPUNCTURE: CPT

## 2024-03-01 PROCEDURE — 93000 ELECTROCARDIOGRAM COMPLETE: CPT

## 2024-03-01 RX ORDER — HYALURONATE SODIUM, STABILIZED 88 MG/4 ML
88 SYRINGE (ML) INTRAARTICULAR
Qty: 1 | Refills: 0 | Status: COMPLETED | COMMUNITY
Start: 2023-09-21 | End: 2024-03-01

## 2024-03-01 RX ORDER — ROSUVASTATIN CALCIUM 5 MG/1
5 TABLET, FILM COATED ORAL
Qty: 90 | Refills: 0 | Status: COMPLETED | COMMUNITY
Start: 2023-04-26 | End: 2024-03-01

## 2024-03-01 RX ORDER — POLYETHYLENE GLYCOL 3350 AND ELECTROLYTES WITH LEMON FLAVOR 236; 22.74; 6.74; 5.86; 2.97 G/4L; G/4L; G/4L; G/4L; G/4L
236 POWDER, FOR SOLUTION ORAL
Qty: 1 | Refills: 0 | Status: COMPLETED | COMMUNITY
Start: 2023-06-26 | End: 2024-03-01

## 2024-03-01 NOTE — HISTORY OF PRESENT ILLNESS
[FreeTextEntry1] : cpe [de-identified] : DERECK CRABTREE is a 63 year F who presents for CPE PMHx HTN Feels well overall  Reports R knee pain, known OA. She was doing well since injection 8/2023 but pain returning in the past week. No injury/trauma.  Self-Dc'ed statin months ago

## 2024-03-01 NOTE — ASSESSMENT
[FreeTextEntry1] : Health Care Maintenance - well visit - routine labs ordered - depression screen negative - ekg- SR - pap smear- due, gyn referral given - mammogram 5/2023, script given  - colonoscopy 7/2023 with Dr. Brooks, due for repeat 7/2028 - dexa- due, script given - flu vaccine- declined  - covid vaccines- s/p 3 doses  - tdap 12/2015  - shingles vaccine- recommend  - pneumonia vaccine- recommend prevnar 20 - advised to get annual eye exams with optometry/ophthalmology, skin exams with dermatology, and dental exams - RTC for CPE in 1 year or sooner prn  HLD - self Dc'ed statin - check lipid panel   R knee pain - known arthritis - follows with ortho Dr. Farmer   Vitamin D deficiency - on PO, does not recall dose

## 2024-03-01 NOTE — PHYSICAL EXAM
[No Acute Distress] : no acute distress [Well-Appearing] : well-appearing [Normal Sclera/Conjunctiva] : normal sclera/conjunctiva [EOMI] : extraocular movements intact [No Respiratory Distress] : no respiratory distress  [No Accessory Muscle Use] : no accessory muscle use [Clear to Auscultation] : lungs were clear to auscultation bilaterally [Regular Rhythm] : with a regular rhythm [Normal Rate] : normal rate  [Normal S1, S2] : normal S1 and S2 [No Edema] : there was no peripheral edema [Soft] : abdomen soft [No Extremity Clubbing/Cyanosis] : no extremity clubbing/cyanosis [Non-distended] : non-distended [Non Tender] : non-tender [Coordination Grossly Intact] : coordination grossly intact [Normal Bowel Sounds] : normal bowel sounds [No Focal Deficits] : no focal deficits [Normal Affect] : the affect was normal [Normal Insight/Judgement] : insight and judgment were intact

## 2024-03-01 NOTE — HEALTH RISK ASSESSMENT
[No] : In the past 12 months have you used drugs other than those required for medical reasons? No [0] : 2) Feeling down, depressed, or hopeless: Not at all (0) [PHQ-2 Negative - No further assessment needed] : PHQ-2 Negative - No further assessment needed [With Family] : lives with family [Employed] : employed [] :  [# Of Children ___] : has [unfilled] children [Fully functional (bathing, dressing, toileting, transferring, walking, feeding)] : Fully functional (bathing, dressing, toileting, transferring, walking, feeding) [Feels Safe at Home] : Feels safe at home [Fully functional (using the telephone, shopping, preparing meals, housekeeping, doing laundry, using] : Fully functional and needs no help or supervision to perform IADLs (using the telephone, shopping, preparing meals, housekeeping, doing laundry, using transportation, managing medications and managing finances) [Current] : Current [One fall no injury in past year] : Patient reported one fall in the past year without injury [5-9] : 5-9 [HIV Test offered] : HIV Test offered [Hepatitis C test offered] : Hepatitis C test offered [[id number]] : 0 [MammogramDate] : 5/2023 [PapSmearDate] : due  [BoneDensityDate] : due  [ColonoscopyDate] : 7/2023 [ColonoscopyComments] : GI Dr. 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

## 2024-03-04 LAB
25(OH)D3 SERPL-MCNC: 19.5 NG/ML
ALBUMIN SERPL ELPH-MCNC: 4.1 G/DL
ALP BLD-CCNC: 80 U/L
ALT SERPL-CCNC: 17 U/L
ANION GAP SERPL CALC-SCNC: 11 MMOL/L
AST SERPL-CCNC: 20 U/L
BILIRUB SERPL-MCNC: 0.3 MG/DL
BUN SERPL-MCNC: 19 MG/DL
CALCIUM SERPL-MCNC: 9.4 MG/DL
CHLORIDE SERPL-SCNC: 102 MMOL/L
CHOLEST SERPL-MCNC: 222 MG/DL
CO2 SERPL-SCNC: 28 MMOL/L
CREAT SERPL-MCNC: 0.85 MG/DL
EGFR: 76 ML/MIN/1.73M2
ESTIMATED AVERAGE GLUCOSE: 120 MG/DL
GLUCOSE SERPL-MCNC: 99 MG/DL
HBA1C MFR BLD HPLC: 5.8 %
HBV SURFACE AB SER QL: NONREACTIVE
HCT VFR BLD CALC: 43 %
HCV AB SER QL: NONREACTIVE
HCV S/CO RATIO: 0.08 S/CO
HDLC SERPL-MCNC: 64 MG/DL
HGB BLD-MCNC: 13.9 G/DL
HIV1+2 AB SPEC QL IA.RAPID: NONREACTIVE
LDLC SERPL CALC-MCNC: 136 MG/DL
MCHC RBC-ENTMCNC: 32.1 PG
MCHC RBC-ENTMCNC: 32.3 GM/DL
MCV RBC AUTO: 99.3 FL
NONHDLC SERPL-MCNC: 158 MG/DL
PLATELET # BLD AUTO: 243 K/UL
POTASSIUM SERPL-SCNC: 4.2 MMOL/L
PROT SERPL-MCNC: 6.7 G/DL
RBC # BLD: 4.33 M/UL
RBC # FLD: 13.4 %
SODIUM SERPL-SCNC: 140 MMOL/L
TRIGL SERPL-MCNC: 124 MG/DL
TSH SERPL-ACNC: 0.96 UIU/ML
WBC # FLD AUTO: 7.33 K/UL

## 2024-03-14 ENCOUNTER — APPOINTMENT (OUTPATIENT)
Dept: ORTHOPEDIC SURGERY | Facility: CLINIC | Age: 64
End: 2024-03-14
Payer: COMMERCIAL

## 2024-03-14 PROCEDURE — 99214 OFFICE O/P EST MOD 30 MIN: CPT

## 2024-03-14 RX ORDER — HYALURONATE SODIUM, STABILIZED 88 MG/4 ML
88 SYRINGE (ML) INTRAARTICULAR
Qty: 1 | Refills: 0 | Status: ACTIVE | COMMUNITY
Start: 2024-03-14

## 2024-03-14 NOTE — DISCUSSION/SUMMARY
[de-identified] : I went over the pathophysiology of the patient's symptoms in great detail with the patient. At this time, she will start a course of physical therapy for strengthening and flexibility. A prescription was provided. We discussed the use of ice, Tylenol and anti-inflammatories to relieve pain. Viscosupplementation was discussed as a solution to the patient's symptoms, and we are requesting Monovisc for right knee.    All of their questions were answered. They understand and consent to the plan.   FU in one month. We will get both hip and knee X-rays upon her return.

## 2024-03-14 NOTE — ADDENDUM
[FreeTextEntry1] : I, LUIS MOJICA, acted solely as a scribe for Dr. Fredy Farmer on this date 03/14/2024.  All medical record entries made by the Scribe were at my, Dr. Fredy Farmer, direction and personally dictated by me on 03/14/2024. I have reviewed the chart and agree that the record accurately reflects my personal performance of the history, physical exam, assessment and plan. I have also personally directed, reviewed, and agreed with the chart.

## 2024-03-14 NOTE — HISTORY OF PRESENT ILLNESS
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 She states her right knee is starting to give out. She notes stiffness of her right knee posteriorly. She denies any swelling or buckling.  She is also complaining of left hip pain that has gone for about one week. She states she has pain while walking. She had a right knee cortisone injection on 08/07/2023 and reports it helped. She is always on her feet. She notes she saw Dr. Murray for a follow-up of her right hip. She had an ultrasound that revealed Psoas and bursitis of her right hip.  PMHx of HTN, HLD.  CT abdomen obtained at [facility] on 06/12/23 revealed: 1. No hydronephrosis or obstructing renal stone. A 3 mm nonobstructive left renal stone. 2. A 2.9 cm hypodense collection within the right iliacus musculature suggests iliopsoas bursitis. Contrast-enhanced MRI can be performed for further evaluation if clinically indicated.  MRI of the Pelvis obtained at [facility] on 06/13/23 revealed: 1. Mass splaying the iliopsoas tendon on the right with heterogeneous enhancement, peripheral enhancement, and a tail of cranial and caudal fat. Differential considerations include a complex loculated bursal fluid collection, cystic peripheral nerve sheath tumor and atypical lipomatous tumor.  XRay of the RIGHT HIP and FEMUR obtained at [facility] on 06/14/23 revealed: 1. No hip fractures or dislocations. 2. Intact pelvic and obturator rings and symmetrically aligned and spaced SI joints and pubic symphysis. 3. Lower lumbar spine degenerative change again apparent. Hypertrophic degenerative rim ossification along lateral right acetabular articular margin otherwise preserved bilateral hip and right knee joint spaces. No gross radiographic evidence for AVN. 4. Superior patellar enthesophyte. 5. Generalized mild osteopenia otherwise no discrete suspicious lytic or blastic lesions.  Radiology Results from 5/24/23: o Right Knee : Standing AP, lateral, tunnel, and skyline views of the knee were obtained and revealed moderate lateral and patellofemoral arthritis. No lytic lesions or calcifications.  o Right Tib/Fib : AP and lateral views were obtained and reveal no fracture of the tibia. No significant degenerative arthritis of the visualized ankle.  5/15/2023 RIGHT knee X-rays in [place] (not [facility]) show no fxs with a traction spur of the patella. RIGHT foot x-rays reveal no fracture or dislocation.

## 2024-03-14 NOTE — PHYSICAL EXAM
[de-identified] : Constitutional o Appearance : well-nourished, well developed, alert, in no acute distress  Head and Face o Head :  Inspection : atraumatic, normocephalic o Face :  Inspection : no visible rash or discoloration Respiratory o Respiratory Effort: breathing unlabored  Neurologic o Mental Status Examination :  Orientation : alert and oriented X 3 Psychiatric o Mood and Affect: mood normal, affect appropriate Cardiovascular o Observation/Palpation : - no swelling Lymphatic o Additional Nodes : No palpable lymph nodes present  o Right Elbow :  Inspection/Palpation :  tenderness over the lateral epicondyle, no swelling or deformities  Range of Motion : full and painless in all planes, no crepitance,   Strength : flexion and extension 5/5  Stability : no joint instability on provocative testing  o Sensation : sensation intact to light touch o Tests: Tinels Sign negative, no pain with resisted pronation or supination, no pain with resisted extension of the middle finger, no pain with resisted extension of the wrist, no pain with gripping  Right Lower Extremity o Buttock : no tenderness, swelling or deformities  o Right Hip :  Inspection/Palpation : no greater trochanteric or ITB tenderness, swelling or deformities  Range of Motion : full and painless in all planes, no crepitance  Stability : joint stability intact  Strength : extension, flexion 3/5, adduction, abduction, internal rotation and external rotation  o Right Knee :  Inspection/Palpation : no medial or lateral compartment tenderness, no medial epicondylar tenderness, medial and lateral facet tenderness of the patella,SLR, tight hamstring, no swelling, mild tenderness over the pipeable plica   Range of Motion : 2-130, pain on full extension and flexion, decreased patellofemoral glide   Stability : no valgus or varus instability present on provocative testing  Strength : flexion and extension 5/5  Tests and Signs : negative Anterior Drawer, negative Lachman, negative Richard  Left Lower Extremity o Buttock : no tenderness, swelling or deformities  o Left Hip :  Inspection/Palpation : no tenderness, no swelling or deformities  Range of Motion : full and painless in all planes, no crepitance  Stability : joint stability intact  Strength : extension, flexion, adduction, abduction 4-/5, internal rotation and external rotation, 4/5  o Left Knee :  Inspection/Palpation : no tenderness to palpation, no swelling  Range of Motion : 0-135 painless, no crepitance  Stability : no valgus or varus instability present on provocative testing  Strength : flexion and extension 5/5  Tests and Signs : negative Anterior Drawer, negative Lachman, negative Richard  Gait and Station: Gait: valgus deformity, walks with a stiffened gait on the right, no significant extremity swelling or lymphedema, good proprioception and balance, no foot drop

## 2024-04-03 DIAGNOSIS — Z23 ENCOUNTER FOR IMMUNIZATION: ICD-10-CM

## 2024-04-15 ENCOUNTER — APPOINTMENT (OUTPATIENT)
Dept: RADIOLOGY | Facility: IMAGING CENTER | Age: 64
End: 2024-04-15
Payer: COMMERCIAL

## 2024-04-15 ENCOUNTER — APPOINTMENT (OUTPATIENT)
Dept: ORTHOPEDIC SURGERY | Facility: CLINIC | Age: 64
End: 2024-04-15
Payer: COMMERCIAL

## 2024-04-15 ENCOUNTER — OUTPATIENT (OUTPATIENT)
Dept: OUTPATIENT SERVICES | Facility: HOSPITAL | Age: 64
LOS: 1 days | End: 2024-04-15
Payer: COMMERCIAL

## 2024-04-15 DIAGNOSIS — Z00.8 ENCOUNTER FOR OTHER GENERAL EXAMINATION: ICD-10-CM

## 2024-04-15 DIAGNOSIS — M76.892 OTHER SPECIFIED ENTHESOPATHIES OF LEFT LOWER LIMB, EXCLUDING FOOT: ICD-10-CM

## 2024-04-15 DIAGNOSIS — M47.816 SPONDYLOSIS W/OUT MYELOPATHY OR RADICULOPATHY, LUMBAR REGION: ICD-10-CM

## 2024-04-15 PROCEDURE — 72100 X-RAY EXAM L-S SPINE 2/3 VWS: CPT

## 2024-04-15 PROCEDURE — 99214 OFFICE O/P EST MOD 30 MIN: CPT

## 2024-04-15 PROCEDURE — 72170 X-RAY EXAM OF PELVIS: CPT

## 2024-04-15 PROCEDURE — 77085 DXA BONE DENSITY AXL VRT FX: CPT | Mod: 26

## 2024-04-15 PROCEDURE — 77085 DXA BONE DENSITY AXL VRT FX: CPT

## 2024-04-15 PROCEDURE — 73564 X-RAY EXAM KNEE 4 OR MORE: CPT | Mod: 50

## 2024-04-15 NOTE — HISTORY OF PRESENT ILLNESS
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 Patient states she has trouble walking long distances and is constantly looking for a place to sit. She states her left feels tired and heavy. She states she has some lower back pain. She states the pain radiates down her left leg. Patient denies that she has any numbness or tingling. She is complaining of left shoulder pain. Patient denies taking any medication to manage her pain. She had a right knee cortisone injection on 08/07/2023 and reports it helped.  PMHx of HTN, HLD.  CT abdomen obtained at [facility] on 06/12/23 revealed: 1. No hydronephrosis or obstructing renal stone. A 3 mm nonobstructive left renal stone. 2. A 2.9 cm hypodense collection within the right iliacus musculature suggests iliopsoas bursitis. Contrast-enhanced MRI can be performed for further evaluation if clinically indicated.  MRI of the Pelvis obtained at [facility] on 06/13/23 revealed: 1. Mass splaying the iliopsoas tendon on the right with heterogeneous enhancement, peripheral enhancement, and a tail of cranial and caudal fat. Differential considerations include a complex loculated bursal fluid collection, cystic peripheral nerve sheath tumor and atypical lipomatous tumor.  XRay of the RIGHT HIP and FEMUR obtained at [facility] on 06/14/23 revealed: 1. No hip fractures or dislocations. 2. Intact pelvic and obturator rings and symmetrically aligned and spaced SI joints and pubic symphysis. 3. Lower lumbar spine degenerative change again apparent. Hypertrophic degenerative rim ossification along lateral right acetabular articular margin otherwise preserved bilateral hip and right knee joint spaces. No gross radiographic evidence for AVN. 4. Superior patellar enthesophyte. 5. Generalized mild osteopenia otherwise no discrete suspicious lytic or blastic lesions.  Radiology Results from 5/24/23: o Right Knee : Standing AP, lateral, tunnel, and skyline views of the knee were obtained and revealed moderate lateral and patellofemoral arthritis. No lytic lesions or calcifications.  o Right Tib/Fib : AP and lateral views were obtained and reveal no fracture of the tibia. No significant degenerative arthritis of the visualized ankle.  5/15/2023 RIGHT knee X-rays in [place] (not [facility]) show no fxs with a traction spur of the patella. RIGHT foot x-rays reveal no fracture or dislocation.

## 2024-04-15 NOTE — PHYSICAL EXAM
[de-identified] : Constitutional o Appearance : well-nourished, well developed, alert, in no acute distress  Head and Face o Head :  Inspection : atraumatic, normocephalic o Face :  Inspection : no visible rash or discoloration Respiratory o Respiratory Effort: breathing unlabored  Neurologic o Mental Status Examination :  Orientation : alert and oriented X 3 Psychiatric o Mood and Affect: mood normal, affect appropriate Cardiovascular o Observation/Palpation : - no swelling Lymphatic o Additional Nodes : No palpable lymph nodes present  Lum bosacral Spine o Inspection : no visible rash or discoloration o Palpation : no paraspinal musculature tenderness o Range of Motion : side bending to the right and left reproduces symptoms, rotation to the right reproduces symptoms o Muscle Strength : paraspinal muscle strength and tone within normal limits o Muscle Tone : paraspinal muscle strength and tone within normal limits o Tests: straight leg test negative bilaterally, BRENDA test negative bilaterally   Right Lower Extremity o Buttock : no tenderness, swelling or deformities  o Right Hip :  Inspection/Palpation : no greater trochanteric or ITB tenderness, swelling or deformities  Range of Motion : full and painless in all planes, no crepitance  Stability : joint stability intact  Strength : extension, flexion 4-/5, adduction, abduction, internal rotation and external rotation  o Right Knee :  Inspection/Palpation : no medial or lateral compartment tenderness, no medial epicondylar tenderness, medial and lateral facet tenderness of the patella,SLR, tight hamstring, no swelling, mild tenderness over the pipeable plica   Range of Motion : 0-135, pain on full extension and flexion, decreased patellofemoral glide   Stability : no valgus or varus instability present on provocative testing  Strength : flexion and extension 5/5  Tests and Signs : negative Anterior Drawer, negative Lachman, negative Richard  Left Lower Extremity o Buttock : no tenderness, swelling or deformities  o Left Hip :  Inspection/Palpation : no tenderness, no swelling or deformities  Range of Motion : full and slight limited IR no crepitance  Stability : joint stability intact  Strength : extension, flexion, adduction, abduction 4-/5, internal rotation and external rotation, 4-/5  o Left Knee :  Inspection/Palpation : no tenderness to palpation, no swelling  Range of Motion : 0-135 painless, no crepitance  Stability : no valgus or varus instability present on provocative testing  Strength : flexion and extension 5/5  Tests and Signs : negative Anterior Drawer, negative Lachman, negative Richard  Gait and Station: Gait: valgus deformity, walks with a stiffened gait on the right, no significant extremity swelling or lymphedema, good proprioception and balance, no foot drop, reasonable good core strength   Radiology Results 4/15/2024 o Lumbosacral Spine : AP and lateral views were obtained and revealed diffuse degenerative disc disease with foraminal stenosis at L4-5 and L5-S1 no obvious compression fractures and degenerative disc disease worse at L4-5  o Pelvis : AP pelvis was obtained and revealed mild degenerative arthritis of both hips  o Right Knee : Standing AP, lateral and tunnel views of the knee were obtained and revealed moderate medial and patellofemoral arthritis  o Left Knee : Standing AP, lateral and tunnel views of the knee were obtained and revealed moderate medial and patellofemoral arthritis

## 2024-04-15 NOTE — ADDENDUM
[FreeTextEntry1] : I, LUIS MOJICA, acted solely as a scribe for Dr. Fredy Farmer on this date 04/15/2024.  All medical record entries made by the Scribe were at my, Dr. Fredy Farmer, direction and personally dictated by me on 04/15/2024. I have reviewed the chart and agree that the record accurately reflects my personal performance of the history, physical exam, assessment and plan. I have also personally directed, reviewed, and agreed with the chart.

## 2024-04-15 NOTE — DISCUSSION/SUMMARY
[de-identified] : I went over the pathophysiology of the patient's symptoms in great detail with the patient. I discussed the underlying pathophysiology of the patient's condition in great detail with the patient. I went over the patient's x-rays with them in great detail. At this time, she will start a course of physical therapy for strengthening and flexibility. A prescription was provided. We discussed the use of ice, Tylenol and anti-inflammatories to relieve pain.   All of their questions were answered. They understand and consent to the plan.   FU in 4-6 weeks.  She will make a separate appointment for her left shoulder.

## 2024-04-29 ENCOUNTER — LABORATORY RESULT (OUTPATIENT)
Age: 64
End: 2024-04-29

## 2024-04-29 ENCOUNTER — APPOINTMENT (OUTPATIENT)
Dept: OBGYN | Facility: CLINIC | Age: 64
End: 2024-04-29
Payer: COMMERCIAL

## 2024-04-29 VITALS
DIASTOLIC BLOOD PRESSURE: 76 MMHG | HEIGHT: 67 IN | WEIGHT: 175 LBS | BODY MASS INDEX: 27.47 KG/M2 | SYSTOLIC BLOOD PRESSURE: 117 MMHG

## 2024-04-29 DIAGNOSIS — Z01.419 ENCOUNTER FOR GYNECOLOGICAL EXAMINATION (GENERAL) (ROUTINE) W/OUT ABNORMAL FINDINGS: ICD-10-CM

## 2024-04-29 PROCEDURE — 99387 INIT PM E/M NEW PAT 65+ YRS: CPT

## 2024-04-29 PROCEDURE — 99459 PELVIC EXAMINATION: CPT

## 2024-04-29 NOTE — PHYSICAL EXAM
[Chaperone Present] : A chaperone was present in the examining room during all aspects of the physical examination [62337] : A chaperone was present during the pelvic exam. [Appropriately responsive] : appropriately responsive [Alert] : alert [No Acute Distress] : no acute distress [No Lymphadenopathy] : no lymphadenopathy [Soft] : soft [Non-tender] : non-tender [Non-distended] : non-distended [No HSM] : No HSM [No Lesions] : no lesions [No Mass] : no mass [Oriented x3] : oriented x3 [Examination Of The Breasts] : a normal appearance [No Masses] : no breast masses were palpable [Vulvar Atrophy] : vulvar atrophy [Labia Majora] : normal [Labia Minora] : normal [Atrophy] : atrophy [Normal] : normal [Uterine Adnexae] : normal [FreeTextEntry2] : Hadley Espinosa [FreeTextEntry9] : Guaiac negative. No masses noted.

## 2024-04-29 NOTE — HISTORY OF PRESENT ILLNESS
[Patient reported PAP Smear was normal] : Patient reported PAP Smear was normal [FreeTextEntry1] : 2024. DERECK CRABTREE 63 year old female  LMP 14yr ago PM, PMHX HTN, HLD presents to [facility] care and for annual visit.    She presents for annual gyn exam and offers no complaints. Denies abnormal vaginal bleeding, abnormal vaginal discharge and vaginitis symptoms. No abdominal or pelvic pain. Denies dysuria, incontinence of urine or hematuria. She reports normal BMs. Denies bloody stool or constipation/diarrhea.  She reports stress urinary incontinence, wears pad every day. She denies VB, abn discharge or vaginitis sxs. She has normal BM, no bloody stool. She denies abdominal or pelvic pain.  Her mother had TIA in [place] and was later found to have multiple blood clots in her lungs after presenting with SOB, sees hematologist, managed w/ Xarelto. Otherwise her mother has h/o GERD, POP managed w/ pessary, herniated disc w/ back pain.   Last OBGYN visit 3yr ago OBHx:  -  x2 GynHx: No Hx of STI, fibroids, ovarian cysts, abnl paps, or pelvic infections. PMH: HTN, nephrolithiasis, colon polyps SHx: trigger finger release surgery FHx: Mother w/ blood clots (in lungs), TIA, HTN. Denies FHx of breast, ovarian, uterine or colon cancer. Med: Losartan, Rosuvastatin All: NKDA Social: current smoker for 20+ yr (5-6 cigarettes in a day). Has 2 grandchildren, 1 lives with her. 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 [Mammogramdate] : 05/23 [TextBox_19] : BIRADS2. Rx given.  [PapSheebaeardate] : 3yr ago [BoneDensityDate] : 04/24 [TextBox_37] : normal [ColonoscopyDate] : 07/23 [TextBox_43] : colon polyp, repeat in 5yr

## 2024-04-29 NOTE — PLAN
[FreeTextEntry1] : Health Maintenance: 63 year old female pt presents to establish gyn care  BSE taught Reviewed diet and exercise Breast and pelvic exam performed Pap/HPV conducted Rx given for mammogram and breast sonogram - due 05/24 DXA d/w pt - normal on 04/24 Advised pt to see PCP annually  Colon polyp: Colonoscopy UTD - repeat in 2028  FHx Mother w/ stroke and blood clots: D/w pt seeing hematologist to discuss her level of risk and for possible workup  Itching on groin: Recommended hydrocortisone  Urinary incontinence: No prolapse observed on exam Referral to specialist given to discuss possible interventions   RTO in 1 year or PRN

## 2024-05-07 ENCOUNTER — APPOINTMENT (OUTPATIENT)
Dept: ORTHOPEDIC SURGERY | Facility: CLINIC | Age: 64
End: 2024-05-07
Payer: COMMERCIAL

## 2024-05-07 VITALS
HEIGHT: 67 IN | BODY MASS INDEX: 27.47 KG/M2 | DIASTOLIC BLOOD PRESSURE: 72 MMHG | SYSTOLIC BLOOD PRESSURE: 112 MMHG | WEIGHT: 175 LBS

## 2024-05-07 DIAGNOSIS — R19.09 OTHER INTRA-ABDOMINAL AND PELVIC SWELLING, MASS AND LUMP: ICD-10-CM

## 2024-05-07 PROCEDURE — 76882 US LMTD JT/FCL EVL NVASC XTR: CPT

## 2024-05-07 PROCEDURE — 99213 OFFICE O/P EST LOW 20 MIN: CPT

## 2024-05-07 NOTE — DISCUSSION/SUMMARY
[de-identified] : Patient's lesions seen as before.  This does look like a cystic area in the cells.  I think this is less likely to cause any pain along the medial side of her leg which is much more in the abductor territory.  Regardless I told him that we can aspirate this and injected to see if it will come back.  There is a approximately 50% chance he can come back.  I also do not feel can help her symptoms.  She is not interested in doing this at this point.  I would then further see her again in 6 months to 1 year for repeat evaluation including ultrasound to make sure is not growing.  If imaging or pathology/biopsy was ordered, the patient was told to make an appointment to review findings right after all imaging is completed.  We discussed risks, benefits and alternatives. Rationale of care was reviewed and all questions were answered. Patient (and family) had all questions answered to her degree of the level of satisfaction. Patient (and family) expressed understanding and interest in proceeding with the plan as outlined.     This note was done with a voice recognition transcription software and any typos are related to this rather than medical error. Surgical risks reviewed. Patient (and family) had all questions answered to an agreeable level of satisfaction. Patient (and family) expressed understanding and interest in proceeding with the plan as outlined.

## 2024-05-07 NOTE — DATA REVIEWED
[Imaging Present] : Present [de-identified] : Focused ultrasound today of the area shows the multicystic area in the distal psoas just lateral to the vessels that is approximately 2.8 x 2 cm.  Overall it is unchanged or slightly smaller than before this really has not grown.He is not tender with palpation.

## 2024-05-07 NOTE — PHYSICAL EXAM
[FreeTextEntry1] : On exam patient is able to move around with full range of motion in her hip.  She does have a palpable mass in the area of the groin above it.  It is not bigger than it was before.  It is approximately 3 cm.  She has no Tinel's thrills or bruits.  She does have some tenderness when it is moved around.  She also has pain going down the medial side of her thigh but it does not seem related. [General Appearance - Well-Appearing] : Well appearing [General Appearance - Well Nourished] : well nourished [Oriented To Time, Place, And Person] : Oriented to person, place, and time [Impaired Insight] : Insight and judgment were intact [Affect] : The affect was normal. [Mood] : the mood was normal [Sclera] : the sclera and conjunctiva were normal [Neck Cervical Mass (___cm)] : no neck mass was observed [Heart Rate And Rhythm] : heart rate was normal and rhythm regular [] : No respiratory distress [Abdomen Soft] : Soft [Normal Station and Gait] : gait and station were normal [Tenderness] : no tenderness [Swelling] : no swelling [Skin Changes - Describe changes:] : No skin changes noted [Full ROM Unless otherwise noted:] : Full range of motion unless otherwise noted: [LE  Motor Strength Normal unless otherwise noted:] : 5/5 strength in bilateral lower extemities unless otherwise noted. [Normal] : Sensation intact to light touch.

## 2024-05-07 NOTE — HISTORY OF PRESENT ILLNESS
[FreeTextEntry1] : Patient is unchanged in the last have seen her.  She is still having the same pain along the medial side of her leg.  It has not stopped her from weightbearing.  She says it is not electric or pulsatile it is just an ache.  It has not stopped her from doing any activity.  She has not felt any growth in any mass in the groin.. [Stable] : stable [2] : currently ~his/her~ pain is 2 out of 10

## 2024-05-08 ENCOUNTER — APPOINTMENT (OUTPATIENT)
Dept: ORTHOPEDIC SURGERY | Facility: CLINIC | Age: 64
End: 2024-05-08

## 2024-05-14 LAB
CYTOLOGY CVX/VAG DOC THIN PREP: NORMAL
HPV HIGH+LOW RISK DNA PNL CVX: DETECTED

## 2024-05-15 ENCOUNTER — NON-APPOINTMENT (OUTPATIENT)
Age: 64
End: 2024-05-15

## 2024-05-28 ENCOUNTER — APPOINTMENT (OUTPATIENT)
Dept: ORTHOPEDIC SURGERY | Facility: CLINIC | Age: 64
End: 2024-05-28
Payer: COMMERCIAL

## 2024-05-28 DIAGNOSIS — M16.0 BILATERAL PRIMARY OSTEOARTHRITIS OF HIP: ICD-10-CM

## 2024-05-28 DIAGNOSIS — M17.0 BILATERAL PRIMARY OSTEOARTHRITIS OF KNEE: ICD-10-CM

## 2024-05-28 DIAGNOSIS — M48.062 SPINAL STENOSIS, LUMBAR REGION WITH NEUROGENIC CLAUDICATION: ICD-10-CM

## 2024-05-28 PROCEDURE — 99213 OFFICE O/P EST LOW 20 MIN: CPT

## 2024-05-28 NOTE — ADDENDUM
[FreeTextEntry1] : I, LUIS MOJICA, acted solely as a scribe for Dr. Fredy Farmer on this date 05/28/2024.  All medical record entries made by the Scribe were at my, Dr. Fredy Farmer, direction and personally dictated by me on 05/28/2024. I have reviewed the chart and agree that the record accurately reflects my personal performance of the history, physical exam, assessment and plan. I have also personally directed, reviewed, and agreed with the chart.

## 2024-05-28 NOTE — PHYSICAL EXAM
[de-identified] : Constitutional o Appearance : well-nourished, well developed, alert, in no acute distress  Head and Face o Head :  Inspection : atraumatic, normocephalic o Face :  Inspection : no visible rash or discoloration Respiratory o Respiratory Effort: breathing unlabored  Neurologic o Mental Status Examination :  Orientation : alert and oriented X 3 Psychiatric o Mood and Affect: mood normal, affect appropriate Cardiovascular o Observation/Palpation : - no swelling Lymphatic o Additional Nodes : No palpable lymph nodes present  Lum bosacral Spine o Inspection : no visible rash or discoloration o Palpation : no paraspinal musculature tenderness o Range of Motion : side bending to the right and left reproduces symptoms, rotation to the right reproduces symptoms o Muscle Strength : paraspinal muscle strength and tone within normal limits o Muscle Tone : paraspinal muscle strength and tone within normal limits o Tests: straight leg test negative bilaterally, BRENDA test negative bilaterally   Right Lower Extremity o Buttock : no tenderness, swelling or deformities  o Right Hip :  Inspection/Palpation : no greater trochanteric or ITB tenderness, swelling or deformities  Range of Motion : full and painless in all planes, no crepitance  Stability : joint stability intact  Strength : extension, flexion 4/5, adduction, abduction, internal rotation and external rotation  o Right Knee :  Inspection/Palpation : no medial or lateral compartment tenderness, no medial epicondylar tenderness, medial and lateral facet tenderness of the patella,SLR, tight hamstring, no swelling, mild tenderness over the pipeable plica   Range of Motion : 0-135, no pain on full extension and flexion, good patellofemoral glide   Stability : no valgus or varus instability present on provocative testing  Strength : flexion and extension 4/5  Tests and Signs : negative Anterior Drawer, negative Lachman, negative Richard  Left Lower Extremity o Buttock : no tenderness, swelling or deformities  o Left Hip :  Inspection/Palpation : no tenderness, no swelling or deformities  Range of Motion : full and slight limited IR no crepitance  Stability : joint stability intact  Strength : extension, flexion, adduction, abduction 4-/5, internal rotation and external rotation, 4/5  o Left Knee :  Inspection/Palpation : no tenderness to palpation, no swelling  Range of Motion : 0-135 painless, no crepitance  Stability : no valgus or varus instability present on provocative testing  Strength : flexion and extension 4/5  Tests and Signs : negative Anterior Drawer, negative Lachman, negative Richard  Gait and Station: Gait: valgus deformity, walks with a stiffened gait on the right, no significant extremity swelling or lymphedema, good proprioception and balance, no foot drop, reasonable good core strength

## 2024-05-28 NOTE — HISTORY OF PRESENT ILLNESS
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 She notes she has been going to physical therapy. She notes she has good and bad days. She was given an HEP. She notes she does not to the exercises at home as often as she should. She denies any swelling or buckling.  She had a right knee cortisone injection on 08/07/2023 and reports it helped.  She admittedly does not do her home program consistently but does understand the importance PMHx of HTN, HLD.  Radiology Results 4/15/2024 o Lumbosacral Spine : AP and lateral views were obtained and revealed diffuse degenerative disc disease with foraminal stenosis at L4-5 and L5-S1 no obvious compression fractures and degenerative disc disease worse at L4-5  o Pelvis : AP pelvis was obtained and revealed mild degenerative arthritis of both hips  o Right Knee : Standing AP, lateral and tunnel views of the knee were obtained and revealed moderate medial and patellofemoral arthritis  o Left Knee : Standing AP, lateral and tunnel views of the knee were obtained and revealed moderate medial and patellofemoral arthritis   CT abdomen obtained at [facility] on 06/12/23 revealed: 1. No hydronephrosis or obstructing renal stone. A 3 mm nonobstructive left renal stone. 2. A 2.9 cm hypodense collection within the right iliacus musculature suggests iliopsoas bursitis. Contrast-enhanced MRI can be performed for further evaluation if clinically indicated.  MRI of the Pelvis obtained at [facility] on 06/13/23 revealed: 1. Mass splaying the iliopsoas tendon on the right with heterogeneous enhancement, peripheral enhancement, and a tail of cranial and caudal fat. Differential considerations include a complex loculated bursal fluid collection, cystic peripheral nerve sheath tumor and atypical lipomatous tumor.  XRay of the RIGHT HIP and FEMUR obtained at [facility] on 06/14/23 revealed: 1. No hip fractures or dislocations. 2. Intact pelvic and obturator rings and symmetrically aligned and spaced SI joints and pubic symphysis. 3. Lower lumbar spine degenerative change again apparent. Hypertrophic degenerative rim ossification along lateral right acetabular articular margin otherwise preserved bilateral hip and right knee joint spaces. No gross radiographic evidence for AVN. 4. Superior patellar enthesophyte. 5. Generalized mild osteopenia otherwise no discrete suspicious lytic or blastic lesions.  Radiology Results from 5/24/23: o Right Knee : Standing AP, lateral, tunnel, and skyline views of the knee were obtained and revealed moderate lateral and patellofemoral arthritis. No lytic lesions or calcifications.  o Right Tib/Fib : AP and lateral views were obtained and reveal no fracture of the tibia. No significant degenerative arthritis of the visualized ankle.  5/15/2023 RIGHT knee X-rays in [place] (not [facility]) show no fxs with a traction spur of the patella. RIGHT foot x-rays reveal no fracture or dislocation.

## 2024-05-28 NOTE — DISCUSSION/SUMMARY
[de-identified] : I went over the pathophysiology of the patient's symptoms in great detail with the patient. I am recommending the patient continues to go to physical therapy to obtain increases in their strength and mobility. A prescription was provided. We discussed the use of ice, Tylenol and anti-inflammatories to relieve pain. At-home strengthening exercises were discussed and demonstrated with the patient.   All of their questions were answered. They understand and consent to the plan.   FU in 6 weeks.

## 2024-06-07 ENCOUNTER — APPOINTMENT (OUTPATIENT)
Dept: INTERNAL MEDICINE | Facility: CLINIC | Age: 64
End: 2024-06-07
Payer: COMMERCIAL

## 2024-06-07 VITALS
SYSTOLIC BLOOD PRESSURE: 134 MMHG | BODY MASS INDEX: 28.25 KG/M2 | HEIGHT: 67 IN | OXYGEN SATURATION: 96 % | WEIGHT: 180 LBS | HEART RATE: 82 BPM | DIASTOLIC BLOOD PRESSURE: 83 MMHG | TEMPERATURE: 98.3 F

## 2024-06-07 VITALS — SYSTOLIC BLOOD PRESSURE: 128 MMHG | DIASTOLIC BLOOD PRESSURE: 70 MMHG

## 2024-06-07 DIAGNOSIS — I10 ESSENTIAL (PRIMARY) HYPERTENSION: ICD-10-CM

## 2024-06-07 DIAGNOSIS — B35.1 TINEA UNGUIUM: ICD-10-CM

## 2024-06-07 DIAGNOSIS — R73.03 PREDIABETES.: ICD-10-CM

## 2024-06-07 DIAGNOSIS — E55.9 VITAMIN D DEFICIENCY, UNSPECIFIED: ICD-10-CM

## 2024-06-07 DIAGNOSIS — E78.5 HYPERLIPIDEMIA, UNSPECIFIED: ICD-10-CM

## 2024-06-07 PROCEDURE — 36415 COLL VENOUS BLD VENIPUNCTURE: CPT

## 2024-06-07 PROCEDURE — G2211 COMPLEX E/M VISIT ADD ON: CPT

## 2024-06-07 PROCEDURE — 99214 OFFICE O/P EST MOD 30 MIN: CPT

## 2024-06-07 NOTE — HISTORY OF PRESENT ILLNESS
[FreeTextEntry1] : follow up [de-identified] : DERECK CRABTREE is a 63 year F who presents for follow up PMHx HTN Feels well overall Needs podiatrist for onychomycosis. Refractory to topical polish, not interested in terbinafine due to AE. She also has callus on R hallux that is painful with prolonged walking.

## 2024-06-07 NOTE — ASSESSMENT
[FreeTextEntry1] : HTN -bp well controlled  -c/w losartan-HCTZ  HLD -c/w statin -check lipid panel and cmp  R hallux callus Toenail onychomycosis -refractory to topical polish, declines terbinafine. -podiatry referral given  Vitamin D deficiency -check level   HCM -due for mammo/US- patient has script from ob/gyn, will make appt

## 2024-06-07 NOTE — PHYSICAL EXAM
[No Acute Distress] : no acute distress [Well-Appearing] : well-appearing [Normal Sclera/Conjunctiva] : normal sclera/conjunctiva [EOMI] : extraocular movements intact [No Respiratory Distress] : no respiratory distress  [No Accessory Muscle Use] : no accessory muscle use [Clear to Auscultation] : lungs were clear to auscultation bilaterally [Normal Rate] : normal rate  [Regular Rhythm] : with a regular rhythm [Normal S1, S2] : normal S1 and S2 [No Edema] : there was no peripheral edema [No Extremity Clubbing/Cyanosis] : no extremity clubbing/cyanosis [Soft] : abdomen soft [Non Tender] : non-tender [Non-distended] : non-distended [Normal Bowel Sounds] : normal bowel sounds [Coordination Grossly Intact] : coordination grossly intact [Normal Affect] : the affect was normal [Normal Insight/Judgement] : insight and judgment were intact [de-identified] : R 1st hallux medial callus, toenail onychomycosis

## 2024-06-10 LAB
25(OH)D3 SERPL-MCNC: 22.9 NG/ML
ALBUMIN SERPL ELPH-MCNC: 4.4 G/DL
ALP BLD-CCNC: 69 U/L
ALT SERPL-CCNC: 23 U/L
ANION GAP SERPL CALC-SCNC: 10 MMOL/L
AST SERPL-CCNC: 23 U/L
BILIRUB SERPL-MCNC: 0.3 MG/DL
BUN SERPL-MCNC: 14 MG/DL
CALCIUM SERPL-MCNC: 9.2 MG/DL
CHLORIDE SERPL-SCNC: 104 MMOL/L
CHOLEST SERPL-MCNC: 207 MG/DL
CO2 SERPL-SCNC: 27 MMOL/L
CREAT SERPL-MCNC: 0.84 MG/DL
EGFR: 77 ML/MIN/1.73M2
ESTIMATED AVERAGE GLUCOSE: 128 MG/DL
GLUCOSE SERPL-MCNC: 85 MG/DL
HBA1C MFR BLD HPLC: 6.1 %
HDLC SERPL-MCNC: 70 MG/DL
LDLC SERPL CALC-MCNC: 127 MG/DL
NONHDLC SERPL-MCNC: 137 MG/DL
POTASSIUM SERPL-SCNC: 4.1 MMOL/L
PROT SERPL-MCNC: 6.6 G/DL
SODIUM SERPL-SCNC: 141 MMOL/L
TRIGL SERPL-MCNC: 55 MG/DL

## 2024-06-10 RX ORDER — ROSUVASTATIN CALCIUM 10 MG/1
10 TABLET, FILM COATED ORAL
Qty: 90 | Refills: 0 | Status: ACTIVE | COMMUNITY
Start: 2024-03-05 | End: 1900-01-01

## 2024-07-09 ENCOUNTER — APPOINTMENT (OUTPATIENT)
Dept: ORTHOPEDIC SURGERY | Facility: CLINIC | Age: 64
End: 2024-07-09
Payer: COMMERCIAL

## 2024-07-09 VITALS — BODY MASS INDEX: 28.25 KG/M2 | WEIGHT: 180 LBS | HEIGHT: 67 IN

## 2024-07-09 DIAGNOSIS — M47.812 SPONDYLOSIS W/OUT MYELOPATHY OR RADICULOPATHY, CERVICAL REGION: ICD-10-CM

## 2024-07-09 DIAGNOSIS — M62.838 OTHER MUSCLE SPASM: ICD-10-CM

## 2024-07-09 PROCEDURE — 99214 OFFICE O/P EST MOD 30 MIN: CPT

## 2024-07-09 PROCEDURE — 72052 X-RAY EXAM NECK SPINE 6/>VWS: CPT

## 2024-07-19 ENCOUNTER — APPOINTMENT (OUTPATIENT)
Dept: UROLOGY | Facility: CLINIC | Age: 64
End: 2024-07-19

## 2024-07-19 ENCOUNTER — EMERGENCY (EMERGENCY)
Facility: HOSPITAL | Age: 64
LOS: 0 days | Discharge: HOME | End: 2024-07-19
Attending: STUDENT IN AN ORGANIZED HEALTH CARE EDUCATION/TRAINING PROGRAM
Payer: COMMERCIAL

## 2024-07-19 VITALS
HEIGHT: 67 IN | DIASTOLIC BLOOD PRESSURE: 85 MMHG | HEART RATE: 81 BPM | SYSTOLIC BLOOD PRESSURE: 148 MMHG | TEMPERATURE: 98 F | OXYGEN SATURATION: 95 % | WEIGHT: 179.9 LBS | RESPIRATION RATE: 17 BRPM

## 2024-07-19 VITALS
HEART RATE: 66 BPM | TEMPERATURE: 98 F | OXYGEN SATURATION: 98 % | DIASTOLIC BLOOD PRESSURE: 79 MMHG | RESPIRATION RATE: 16 BRPM | SYSTOLIC BLOOD PRESSURE: 135 MMHG

## 2024-07-19 DIAGNOSIS — R11.0 NAUSEA: ICD-10-CM

## 2024-07-19 DIAGNOSIS — E27.9 DISORDER OF ADRENAL GLAND, UNSPECIFIED: ICD-10-CM

## 2024-07-19 DIAGNOSIS — I10 ESSENTIAL (PRIMARY) HYPERTENSION: ICD-10-CM

## 2024-07-19 DIAGNOSIS — R10.9 UNSPECIFIED ABDOMINAL PAIN: ICD-10-CM

## 2024-07-19 DIAGNOSIS — R35.0 FREQUENCY OF MICTURITION: ICD-10-CM

## 2024-07-19 DIAGNOSIS — E78.5 HYPERLIPIDEMIA, UNSPECIFIED: ICD-10-CM

## 2024-07-19 DIAGNOSIS — Z87.442 PERSONAL HISTORY OF URINARY CALCULI: ICD-10-CM

## 2024-07-19 DIAGNOSIS — N20.0 CALCULUS OF KIDNEY: ICD-10-CM

## 2024-07-19 LAB
ALBUMIN SERPL ELPH-MCNC: 3.8 G/DL — SIGNIFICANT CHANGE UP (ref 3.3–5)
ALP SERPL-CCNC: 78 U/L — SIGNIFICANT CHANGE UP (ref 40–120)
ALT FLD-CCNC: 42 U/L — SIGNIFICANT CHANGE UP (ref 12–78)
ANION GAP SERPL CALC-SCNC: 5 MMOL/L — SIGNIFICANT CHANGE UP (ref 5–17)
APPEARANCE UR: CLEAR — SIGNIFICANT CHANGE UP
AST SERPL-CCNC: 30 U/L — SIGNIFICANT CHANGE UP (ref 15–37)
BASOPHILS # BLD AUTO: 0.06 K/UL — SIGNIFICANT CHANGE UP (ref 0–0.2)
BASOPHILS NFR BLD AUTO: 0.7 % — SIGNIFICANT CHANGE UP (ref 0–2)
BILIRUB SERPL-MCNC: 0.4 MG/DL — SIGNIFICANT CHANGE UP (ref 0.2–1.2)
BILIRUB UR-MCNC: NEGATIVE — SIGNIFICANT CHANGE UP
BUN SERPL-MCNC: 22 MG/DL — SIGNIFICANT CHANGE UP (ref 7–23)
CALCIUM SERPL-MCNC: 9.8 MG/DL — SIGNIFICANT CHANGE UP (ref 8.5–10.1)
CHLORIDE SERPL-SCNC: 104 MMOL/L — SIGNIFICANT CHANGE UP (ref 96–108)
CO2 SERPL-SCNC: 30 MMOL/L — SIGNIFICANT CHANGE UP (ref 22–31)
COLOR SPEC: YELLOW — SIGNIFICANT CHANGE UP
CREAT SERPL-MCNC: 1.12 MG/DL — SIGNIFICANT CHANGE UP (ref 0.5–1.3)
DIFF PNL FLD: ABNORMAL
EGFR: 55 ML/MIN/1.73M2 — LOW
EOSINOPHIL # BLD AUTO: 0.22 K/UL — SIGNIFICANT CHANGE UP (ref 0–0.5)
EOSINOPHIL NFR BLD AUTO: 2.4 % — SIGNIFICANT CHANGE UP (ref 0–6)
EPI CELLS # UR: PRESENT
GLUCOSE SERPL-MCNC: 119 MG/DL — HIGH (ref 70–99)
GLUCOSE UR QL: NEGATIVE MG/DL — SIGNIFICANT CHANGE UP
HCT VFR BLD CALC: 43.3 % — SIGNIFICANT CHANGE UP (ref 34.5–45)
HGB BLD-MCNC: 14.4 G/DL — SIGNIFICANT CHANGE UP (ref 11.5–15.5)
HYALINE CASTS # UR AUTO: PRESENT
IMM GRANULOCYTES NFR BLD AUTO: 0.2 % — SIGNIFICANT CHANGE UP (ref 0–0.9)
KETONES UR-MCNC: NEGATIVE MG/DL — SIGNIFICANT CHANGE UP
LEUKOCYTE ESTERASE UR-ACNC: ABNORMAL
LIDOCAIN IGE QN: 43 U/L — SIGNIFICANT CHANGE UP (ref 13–75)
LYMPHOCYTES # BLD AUTO: 2.25 K/UL — SIGNIFICANT CHANGE UP (ref 1–3.3)
LYMPHOCYTES # BLD AUTO: 24.8 % — SIGNIFICANT CHANGE UP (ref 13–44)
MCHC RBC-ENTMCNC: 31.6 PG — SIGNIFICANT CHANGE UP (ref 27–34)
MCHC RBC-ENTMCNC: 33.3 G/DL — SIGNIFICANT CHANGE UP (ref 32–36)
MCV RBC AUTO: 95.2 FL — SIGNIFICANT CHANGE UP (ref 80–100)
MONOCYTES # BLD AUTO: 0.97 K/UL — HIGH (ref 0–0.9)
MONOCYTES NFR BLD AUTO: 10.7 % — SIGNIFICANT CHANGE UP (ref 2–14)
NEUTROPHILS # BLD AUTO: 5.55 K/UL — SIGNIFICANT CHANGE UP (ref 1.8–7.4)
NEUTROPHILS NFR BLD AUTO: 61.2 % — SIGNIFICANT CHANGE UP (ref 43–77)
NITRITE UR-MCNC: NEGATIVE — SIGNIFICANT CHANGE UP
NRBC # BLD: 0 /100 WBCS — SIGNIFICANT CHANGE UP (ref 0–0)
PH UR: 7.5 — SIGNIFICANT CHANGE UP (ref 5–8)
PLATELET # BLD AUTO: 176 K/UL — SIGNIFICANT CHANGE UP (ref 150–400)
POTASSIUM SERPL-MCNC: 3.7 MMOL/L — SIGNIFICANT CHANGE UP (ref 3.5–5.3)
POTASSIUM SERPL-SCNC: 3.7 MMOL/L — SIGNIFICANT CHANGE UP (ref 3.5–5.3)
PROT SERPL-MCNC: 7 GM/DL — SIGNIFICANT CHANGE UP (ref 6–8.3)
PROT UR-MCNC: 30 MG/DL
RBC # BLD: 4.55 M/UL — SIGNIFICANT CHANGE UP (ref 3.8–5.2)
RBC # FLD: 13.2 % — SIGNIFICANT CHANGE UP (ref 10.3–14.5)
RBC CASTS # UR COMP ASSIST: 2 /HPF — SIGNIFICANT CHANGE UP (ref 0–4)
SODIUM SERPL-SCNC: 139 MMOL/L — SIGNIFICANT CHANGE UP (ref 135–145)
SP GR SPEC: >1.03 — HIGH (ref 1–1.03)
UROBILINOGEN FLD QL: 1 MG/DL — SIGNIFICANT CHANGE UP (ref 0.2–1)
WBC # BLD: 9.07 K/UL — SIGNIFICANT CHANGE UP (ref 3.8–10.5)
WBC # FLD AUTO: 9.07 K/UL — SIGNIFICANT CHANGE UP (ref 3.8–10.5)
WBC UR QL: 2 /HPF — SIGNIFICANT CHANGE UP (ref 0–5)

## 2024-07-19 PROCEDURE — 93010 ELECTROCARDIOGRAM REPORT: CPT

## 2024-07-19 PROCEDURE — 99285 EMERGENCY DEPT VISIT HI MDM: CPT

## 2024-07-19 PROCEDURE — 74177 CT ABD & PELVIS W/CONTRAST: CPT | Mod: 26,MC

## 2024-07-19 RX ORDER — KETOROLAC TROMETHAMINE 30 MG/ML
15 INJECTION, SOLUTION INTRAMUSCULAR ONCE
Refills: 0 | Status: DISCONTINUED | OUTPATIENT
Start: 2024-07-19 | End: 2024-07-19

## 2024-07-19 RX ORDER — ONDANSETRON HYDROCHLORIDE 2 MG/ML
4 INJECTION INTRAMUSCULAR; INTRAVENOUS ONCE
Refills: 0 | Status: COMPLETED | OUTPATIENT
Start: 2024-07-19 | End: 2024-07-19

## 2024-07-19 RX ORDER — MORPHINE SULFATE 100 MG/1
4 TABLET, EXTENDED RELEASE ORAL ONCE
Refills: 0 | Status: DISCONTINUED | OUTPATIENT
Start: 2024-07-19 | End: 2024-07-19

## 2024-07-19 RX ORDER — SODIUM CHLORIDE 0.9 % (FLUSH) 0.9 %
500 SYRINGE (ML) INJECTION ONCE
Refills: 0 | Status: COMPLETED | OUTPATIENT
Start: 2024-07-19 | End: 2024-07-19

## 2024-07-19 RX ORDER — TRAMADOL HYDROCHLORIDE 50 MG/1
1 TABLET, FILM COATED ORAL
Qty: 9 | Refills: 0
Start: 2024-07-19 | End: 2024-07-21

## 2024-07-19 RX ORDER — SODIUM CHLORIDE 0.9 % (FLUSH) 0.9 %
1000 SYRINGE (ML) INJECTION ONCE
Refills: 0 | Status: COMPLETED | OUTPATIENT
Start: 2024-07-19 | End: 2024-07-19

## 2024-07-19 RX ORDER — TAMSULOSIN HYDROCHLORIDE 0.4 MG/1
1 CAPSULE ORAL
Qty: 10 | Refills: 0
Start: 2024-07-19 | End: 2024-07-28

## 2024-07-19 RX ADMIN — Medication 500 MILLILITER(S): at 07:09

## 2024-07-19 RX ADMIN — Medication 1000 MILLILITER(S): at 05:17

## 2024-07-19 RX ADMIN — ONDANSETRON HYDROCHLORIDE 4 MILLIGRAM(S): 2 INJECTION INTRAMUSCULAR; INTRAVENOUS at 05:17

## 2024-07-19 RX ADMIN — Medication 1000 MILLILITER(S): at 06:30

## 2024-07-19 RX ADMIN — MORPHINE SULFATE 4 MILLIGRAM(S): 100 TABLET, EXTENDED RELEASE ORAL at 05:17

## 2024-07-19 RX ADMIN — MORPHINE SULFATE 4 MILLIGRAM(S): 100 TABLET, EXTENDED RELEASE ORAL at 05:46

## 2024-07-19 RX ADMIN — KETOROLAC TROMETHAMINE 15 MILLIGRAM(S): 30 INJECTION, SOLUTION INTRAMUSCULAR at 07:09

## 2024-07-22 LAB
CULTURE RESULTS: SIGNIFICANT CHANGE UP
SPECIMEN SOURCE: SIGNIFICANT CHANGE UP

## 2024-07-30 ENCOUNTER — APPOINTMENT (OUTPATIENT)
Dept: UROLOGY | Facility: CLINIC | Age: 64
End: 2024-07-30
Payer: COMMERCIAL

## 2024-07-30 VITALS
RESPIRATION RATE: 16 BRPM | BODY MASS INDEX: 28.25 KG/M2 | HEART RATE: 73 BPM | WEIGHT: 180 LBS | HEIGHT: 67 IN | DIASTOLIC BLOOD PRESSURE: 74 MMHG | SYSTOLIC BLOOD PRESSURE: 109 MMHG

## 2024-07-30 DIAGNOSIS — D35.02 BENIGN NEOPLASM OF LEFT ADRENAL GLAND: ICD-10-CM

## 2024-07-30 DIAGNOSIS — N20.1 CALCULUS OF URETER: ICD-10-CM

## 2024-07-30 DIAGNOSIS — D36.9 BENIGN NEOPLASM, UNSPECIFIED SITE: ICD-10-CM

## 2024-07-30 PROCEDURE — 99204 OFFICE O/P NEW MOD 45 MIN: CPT

## 2024-07-30 RX ORDER — TAMSULOSIN HYDROCHLORIDE 0.4 MG/1
0.4 CAPSULE ORAL
Qty: 30 | Refills: 1 | Status: ACTIVE | COMMUNITY
Start: 2024-07-30 | End: 1900-01-01

## 2024-07-30 RX ORDER — DEXAMETHASONE 1 MG/1
1 TABLET ORAL
Qty: 1 | Refills: 0 | Status: ACTIVE | COMMUNITY
Start: 2024-07-30 | End: 1900-01-01

## 2024-07-30 NOTE — HISTORY OF PRESENT ILLNESS
[FreeTextEntry1] : : 1960 Referring Provider: SELF,REFERRED   HPI:  Ms. DERECK LEYVA is a 63 year F with a PMHx notable for nephrolithiasis presenting after visit to the ED. She presented on  with L flank pain and was found to have 3 mm L UVJ stone. Patient is unsure if she has passed stone, but does sometimes feel heaviness in LLQ. Denies gross hematuria, dysuria, flank pain, nausea, fevers, or other urinary symptoms. Also noted have a 1.2 cm adrenal adenoma, has not had an adrenal workup. Denies weight gain or blood pressure changes, no issues with her electrolytes.   Anticoagulation: None All: None PMHx: HTN, HLD PSHx: Colonoscopy with polyp removal FHx: No family history of  malignancy SocHx: Current smoker for 20 years   Imaging: 3 mm calculus at the left ureterovesicular junction with associated mild left-sided hydroureteronephrosis with delayed nephrogram. Stable 1.2 cm adenoma. [Urinary Retention] : no urinary retention [Urinary Urgency] : no urinary urgency [Urinary Frequency] : no urinary frequency [Nocturia] : no nocturia [Straining] : no straining [Dysuria] : no dysuria [Hematuria - Gross] : no gross hematuria

## 2024-07-30 NOTE — ASSESSMENT
[FreeTextEntry1] : 62 yo F presenting with L UVJ stone and stable adenoma.  #Nephrolithiasis - Urine culture - Prescription sent for flomax, strain urine - CT abdomen/pelvis in 1 month - Booking sheet submitted for possible URS  #Adenoma - Hold BP medication until after lab work - Prescription sent for low dose dexamethasone - Serum AM cortisol, aldosterone, plasma free metanephrines this week - Random urine metanephrines

## 2024-07-30 NOTE — PHYSICAL EXAM
[General Appearance - Well Developed] : well developed [General Appearance - Well Nourished] : well nourished [Heart Rate And Rhythm] : heart rate and rhythm were normal [] : no respiratory distress [Abdomen Soft] : soft [Normal Station and Gait] : the gait and station were normal for the patient's age [No Focal Deficits] : no focal deficits [Oriented To Time, Place, And Person] : oriented to person, place, and time

## 2024-07-30 NOTE — ASSESSMENT
[FreeTextEntry1] : 64 yo F presenting with L UVJ stone and stable adenoma.  #Nephrolithiasis - Urine culture - Prescription sent for flomax, strain urine - CT abdomen/pelvis in 1 month - Booking sheet submitted for possible URS  #Adenoma - Hold BP medication until after lab work - Prescription sent for low dose dexamethasone - Serum AM cortisol, aldosterone, plasma free metanephrines this week - Random urine metanephrines

## 2024-07-31 LAB — BACTERIA UR CULT: NORMAL

## 2024-08-01 ENCOUNTER — APPOINTMENT (OUTPATIENT)
Dept: MAMMOGRAPHY | Facility: IMAGING CENTER | Age: 64
End: 2024-08-01
Payer: COMMERCIAL

## 2024-08-01 ENCOUNTER — RESULT REVIEW (OUTPATIENT)
Age: 64
End: 2024-08-01

## 2024-08-01 ENCOUNTER — OUTPATIENT (OUTPATIENT)
Dept: OUTPATIENT SERVICES | Facility: HOSPITAL | Age: 64
LOS: 1 days | End: 2024-08-01
Payer: COMMERCIAL

## 2024-08-01 DIAGNOSIS — Z00.00 ENCOUNTER FOR GENERAL ADULT MEDICAL EXAMINATION WITHOUT ABNORMAL FINDINGS: ICD-10-CM

## 2024-08-01 PROCEDURE — 77063 BREAST TOMOSYNTHESIS BI: CPT

## 2024-08-01 PROCEDURE — 77063 BREAST TOMOSYNTHESIS BI: CPT | Mod: 26

## 2024-08-01 PROCEDURE — 77067 SCR MAMMO BI INCL CAD: CPT

## 2024-08-01 PROCEDURE — 77067 SCR MAMMO BI INCL CAD: CPT | Mod: 26

## 2024-08-04 LAB
ALDOSTERONE SERUM: 26.1 NG/DL
CORTIS SERPL-MCNC: 2.8 UG/DL

## 2024-08-06 LAB
METANEPHRINE, PL: 38.9 PG/ML
NORMETANEPHRINE, PL: 123.6 PG/ML

## 2024-08-07 LAB
CREAT UR-MCNC: 144.2 MG/DL
METANEPHS 24H UR-MCNC: 188 UG/L
METANEPHS/CREAT UR: 0.6
NORMETANEPHRINE 24H UR-MCNC: 553 UG/L

## 2024-08-15 ENCOUNTER — APPOINTMENT (OUTPATIENT)
Dept: ORTHOPEDIC SURGERY | Facility: CLINIC | Age: 64
End: 2024-08-15
Payer: COMMERCIAL

## 2024-08-15 VITALS — HEIGHT: 67 IN | WEIGHT: 180 LBS | BODY MASS INDEX: 28.25 KG/M2

## 2024-08-15 DIAGNOSIS — M47.812 SPONDYLOSIS W/OUT MYELOPATHY OR RADICULOPATHY, CERVICAL REGION: ICD-10-CM

## 2024-08-15 DIAGNOSIS — M76.821 POSTERIOR TIBIAL TENDINITIS, RIGHT LEG: ICD-10-CM

## 2024-08-15 PROCEDURE — 20605 DRAIN/INJ JOINT/BURSA W/O US: CPT | Mod: RT

## 2024-08-15 PROCEDURE — 73630 X-RAY EXAM OF FOOT: CPT | Mod: RT

## 2024-08-15 PROCEDURE — 99214 OFFICE O/P EST MOD 30 MIN: CPT | Mod: 25

## 2024-08-15 RX ORDER — MELOXICAM 15 MG/1
15 TABLET ORAL
Qty: 30 | Refills: 3 | Status: ACTIVE | COMMUNITY
Start: 2024-08-15 | End: 1900-01-01

## 2024-08-15 NOTE — DISCUSSION/SUMMARY
[de-identified] : I went over the pathophysiology of the patient's symptoms in great detail with the patient. I discussed the underlying pathophysiology of the patient's condition in great detail with the patient. I went over the patient's x-rays with them in great detail. The patient elected to receive a cortisone injection into her right foot today, and tolerated it well. I instructed the patient on ROM exercises, and told them to take it easy. The use of ice and rest was reviewed with the patient. The patient may resume activities tomorrow. We discussed the use of ice, Tylenol and anti-inflammatories to relieve pain. A discussion ensued about shoe wear modifications. I recommend  the patient uses inserts in her shoes. We are prescribing Meloxicam at this time. A prescription was provided. At-home strengthening exercises were discussed and demonstrated with the patient.   All of their questions were answered. They understand and consent to the plan.   FU in one month.

## 2024-08-15 NOTE — HISTORY OF PRESENT ILLNESS
[de-identified] : 63-year-old RHD female presents complaining of right foot pain. The patient cannot attribute her pain to any injury, fall, or trauma. She notes she stepped out of bed and felt right foot pan. She notes she was barely able to finish her shift yesterday. Patient presents ambulating with a cane. She notes she went to a podiatrist in July and was prescribed inserts for feet. She notes she is miserable. She notes physical therapy did help.   Radiology Results 7/9/2024 o Cervical Spine : AP and lateral views were obtained and revealed diffuse facet arthropathy, with degenerative disc disease worse at C3-4 and C4-5 and C5-6 with bridging osteophytes at C4-5 and mild foraminal stenosis on the left side worse than right

## 2024-08-15 NOTE — ADDENDUM
[FreeTextEntry1] : I, LUIS MOJICA, acted solely as a scribe for Dr. Fredy Farmer on this date 08/15/2024.  All medical record entries made by the Scribe were at my, Dr. Fredy Farmer, direction and personally dictated by me on 08/15/2024. I have reviewed the chart and agree that the record accurately reflects my personal performance of the history, physical exam, assessment and plan. I have also personally directed, reviewed, and agreed with the chart.

## 2024-08-15 NOTE — PHYSICAL EXAM
[de-identified] : Constitutional o Appearance : well-nourished, well developed, alert, in no acute distress  Head and Face o Head :  Inspection : atraumatic, normocephalic o Face :  Inspection : no visible rash or discoloration Respiratory o Respiratory Effort: breathing unlabored  Neurologic o Sensation : Normal sensation  Psychiatric o Mood and Affect: mood normal, affect appropriate  Lymphatic o Additional Nodes : No palpable lymph nodes present   Cervical Spine o Inspection/Palpation :  Inspection : alignment midline, normal degree of lordosis present  Skin : normal appearance, no masses or tenderness, trachea midline  Palpation : left paracervical and parascapular tenderness  o Range of Motion : extension causes left paracervical discomfort, left side worse than right rotation  o Tests: Negative Spurlings test  Right Upper Extremity o Right Shoulder :  Inspection/Palpation : no tenderness, no swelling or deformities  Range of Motion : full and painless in all planes, no crepitance  Strength : forward elevation 5/5, IR 5/5, ER 5/5, ER at 90 of abduction 5/5, supraspinatus 5/5, adduction 5/5, abduction 5/5, biceps/triceps 5/5,  5/5  Stability : no joint instability on provocative testing   Tests: Lam negative, Neer negative, Ernestine negative, drop arm test negative, Morrow's test negative  Left Upper Extremity o Left Shoulder :  Inspection/Palpation : no tenderness, no swelling or deformities  Range of Motion : full and painless in all planes, no crepitance  Strength : forward elevation 4-/5, IR 5/5, ER 5/5, ER at 90 of abduction 4-/5, supraspinatus 4-/5 limited by paracervical discomfort, adduction 5/5, abduction 5/5, biceps/triceps 5/5,  5/5  Stability : no joint instability on provocative testing   Tests: Lam negative, Neer negative, Ernestine negative, drop arm test negative, Morrow's test negative  o Right Foot:  Inspection/Palpation : no tenderness to palpation, no swelling  Range of Motion : arc of motion full and painless in all planes, plantar flexion eversion causes pain  Stability : no joint instability on provocative testing  Strength : all muscles 5/5 o Skin : no erythema or ecchymosis present   Gait and Station: Gait: antalgic on the right poor pushoff , reflexes are hypo active but symmetrical, no significant extremity swelling or lymphedema, good proprioception and balance  Radiology Results 8/15/2024 o Right Foot: AP, lateral and oblique views were obtained and revealed midfoot and posterior heel spur and plano valgus deformity, calcification about the cuboid   o Foot injection : Indication- foot osteoarthritis, Anatomic location- right posterior tibial tendon space, Spray - area was sterilized with Betadine and alcohol and anesthetized with Ethyl Chloride , needle used-20G, Medications given- 1cc's lidocaine, 0.5cc's kenalog, 0.5 cc's dexamethasone. The patient tolerated the procedure well.

## 2024-08-29 ENCOUNTER — OUTPATIENT (OUTPATIENT)
Dept: OUTPATIENT SERVICES | Facility: HOSPITAL | Age: 64
LOS: 1 days | End: 2024-08-29
Payer: COMMERCIAL

## 2024-08-29 ENCOUNTER — APPOINTMENT (OUTPATIENT)
Dept: CT IMAGING | Facility: IMAGING CENTER | Age: 64
End: 2024-08-29

## 2024-08-29 DIAGNOSIS — N20.1 CALCULUS OF URETER: ICD-10-CM

## 2024-08-29 DIAGNOSIS — Z00.8 ENCOUNTER FOR OTHER GENERAL EXAMINATION: ICD-10-CM

## 2024-08-29 PROCEDURE — 74176 CT ABD & PELVIS W/O CONTRAST: CPT | Mod: 26

## 2024-08-29 PROCEDURE — 74176 CT ABD & PELVIS W/O CONTRAST: CPT

## 2024-09-25 ENCOUNTER — RX RENEWAL (OUTPATIENT)
Age: 64
End: 2024-09-25

## 2024-09-26 ENCOUNTER — APPOINTMENT (OUTPATIENT)
Dept: ORTHOPEDIC SURGERY | Facility: CLINIC | Age: 64
End: 2024-09-26
Payer: COMMERCIAL

## 2024-09-26 DIAGNOSIS — M16.0 BILATERAL PRIMARY OSTEOARTHRITIS OF HIP: ICD-10-CM

## 2024-09-26 PROCEDURE — 99213 OFFICE O/P EST LOW 20 MIN: CPT

## 2024-09-26 NOTE — ADDENDUM
[FreeTextEntry1] : I, LUIS MOJICA, acted solely as a scribe for Dr. Fredy Farmer on this date 09/26/2024.  All medical record entries made by the Scribe were at my, Dr. Fredy Farmer, direction and personally dictated by me on 09/26/2024. I have reviewed the chart and agree that the record accurately reflects my personal performance of the history, physical exam, assessment and plan. I have also personally directed, reviewed, and agreed with the chart.

## 2024-09-26 NOTE — HISTORY OF PRESENT ILLNESS
[de-identified] : 63-year-old RHD female presents complaining of left-sided shoulder pain. The patient cannot attribute her pain to any injury, fall, or trauma.  She notes her left shoulder has improved as well as her right foot. She denies numbness and tingling into her fingers. She notes she has not been to physical therapy since her last visit. 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 She is right-hand dominant. She notes pushing a cart can aggravates the pain. She notes she avoids sleeping or laying on her left side. She notes night cramps. She notes she has bad posture and is hunched over when she is cooking.   Radiology Results 7/9/2024 o Cervical Spine : AP and lateral views were obtained and revealed diffuse facet arthropathy, with degenerative disc disease worse at C3-4 and C4-5 and C5-6 with bridging osteophytes at C4-5 and mild foraminal stenosis on the left side worse than right

## 2024-09-26 NOTE — DISCUSSION/SUMMARY
[de-identified] : I went over the pathophysiology of the patient's symptoms in great detail with the patient. At-home strengthening exercises were discussed and demonstrated with the patient. We discussed the use of a stool when standing for long periods of time. We discussed the use of ice, Tylenol and anti-inflammatories to relieve pain. She needs to avoid high-impact activities such as running and jumping or riding a treadmill. I recommend alternative activities such as riding a stationary bike or elliptical on low tension. She should focus on light weight and high repetition exercises. She should avoid squatting and kneeling.   All of their questions were answered. They understand and consent to the plan.   FU  as needed.

## 2024-09-26 NOTE — PHYSICAL EXAM
[de-identified] : Constitutional o Appearance : well-nourished, well developed, alert, in no acute distress  Head and Face o Head :  Inspection : atraumatic, normocephalic o Face :  Inspection : no visible rash or discoloration Respiratory o Respiratory Effort: breathing unlabored  Neurologic o Sensation : Normal sensation  Psychiatric o Mood and Affect: mood normal, affect appropriate  Lymphatic o Additional Nodes : No palpable lymph nodes present   Cervical Spine o Inspection/Palpation :  Inspection : alignment midline, normal degree of lordosis present  Skin : normal appearance, no masses or tenderness, trachea midline  Palpation : left paracervical and parascapular tenderness  o Range of Motion : extension causes left paracervical discomfort, left side worse than right rotation  o Tests: Negative Spurlings test  Right Upper Extremity o Right Shoulder :  Inspection/Palpation : no tenderness, no swelling or deformities  Range of Motion : full and painless in all planes, no crepitance  Strength : forward elevation 5/5, IR 5/5, ER 5/5, ER at 90 of abduction 5/5, supraspinatus 5/5, adduction 5/5, abduction 5/5, biceps/triceps 5/5,  5/5  Stability : no joint instability on provocative testing   Tests: Lam negative, Neer negative, Ernestine negative, drop arm test negative, Banner's test negative  Left Upper Extremity o Left Shoulder :  Inspection/Palpation : no anterior capsular  tenderness, no swelling or deformities  Range of Motion : full and painless in all planes, no crepitance  Strength : forward elevation 5/5, IR 5/5, ER 5/5, ER at 90 of abduction 5/5, supraspinatus 5/5 limited by paracervical discomfort, adduction 5/5, abduction 5/5, biceps/triceps 5/5,  5/5  Stability : no joint instability on provocative testing   Tests: Lam negative, Neer negative, Ernestine negative, drop arm test negative, Banner's test negative  Gait and Station: Gait: gait normal, reflexes are hypo active but symmetrical, no significant extremity swelling or lymphedema, good proprioception and balance

## 2024-09-26 NOTE — REVIEW OF SYSTEMS
Medication is being filled for 1 time refill only due to:  Patient needs labs FLP. Needs full fasting physical      Kim White RN -- [facility] [employer]        [Negative] : Heme/Lymph

## 2024-10-09 ENCOUNTER — APPOINTMENT (OUTPATIENT)
Dept: UROLOGY | Facility: HOSPITAL | Age: 64
End: 2024-10-09

## 2024-11-12 ENCOUNTER — APPOINTMENT (OUTPATIENT)
Dept: ULTRASOUND IMAGING | Facility: IMAGING CENTER | Age: 64
End: 2024-11-12
Payer: COMMERCIAL

## 2024-11-12 ENCOUNTER — APPOINTMENT (OUTPATIENT)
Dept: INTERNAL MEDICINE | Facility: CLINIC | Age: 64
End: 2024-11-12
Payer: COMMERCIAL

## 2024-11-12 ENCOUNTER — OUTPATIENT (OUTPATIENT)
Dept: OUTPATIENT SERVICES | Facility: HOSPITAL | Age: 64
LOS: 1 days | End: 2024-11-12
Payer: COMMERCIAL

## 2024-11-12 VITALS
WEIGHT: 177 LBS | HEIGHT: 67 IN | DIASTOLIC BLOOD PRESSURE: 85 MMHG | HEART RATE: 71 BPM | BODY MASS INDEX: 27.78 KG/M2 | RESPIRATION RATE: 15 BRPM | SYSTOLIC BLOOD PRESSURE: 143 MMHG | OXYGEN SATURATION: 97 % | TEMPERATURE: 97.7 F

## 2024-11-12 VITALS — SYSTOLIC BLOOD PRESSURE: 130 MMHG | DIASTOLIC BLOOD PRESSURE: 78 MMHG

## 2024-11-12 DIAGNOSIS — I10 ESSENTIAL (PRIMARY) HYPERTENSION: ICD-10-CM

## 2024-11-12 DIAGNOSIS — R73.03 PREDIABETES.: ICD-10-CM

## 2024-11-12 DIAGNOSIS — N20.1 CALCULUS OF URETER: ICD-10-CM

## 2024-11-12 DIAGNOSIS — E78.5 HYPERLIPIDEMIA, UNSPECIFIED: ICD-10-CM

## 2024-11-12 DIAGNOSIS — E55.9 VITAMIN D DEFICIENCY, UNSPECIFIED: ICD-10-CM

## 2024-11-12 DIAGNOSIS — Z23 ENCOUNTER FOR IMMUNIZATION: ICD-10-CM

## 2024-11-12 PROCEDURE — 90662 IIV NO PRSV INCREASED AG IM: CPT

## 2024-11-12 PROCEDURE — 76775 US EXAM ABDO BACK WALL LIM: CPT

## 2024-11-12 PROCEDURE — 76775 US EXAM ABDO BACK WALL LIM: CPT | Mod: 26

## 2024-11-12 PROCEDURE — 36415 COLL VENOUS BLD VENIPUNCTURE: CPT

## 2024-11-12 PROCEDURE — 99214 OFFICE O/P EST MOD 30 MIN: CPT | Mod: 25

## 2024-11-12 PROCEDURE — G0008: CPT

## 2024-11-13 LAB
25(OH)D3 SERPL-MCNC: 22.8 NG/ML
ALBUMIN SERPL ELPH-MCNC: 4.2 G/DL
ALP BLD-CCNC: 78 U/L
ALT SERPL-CCNC: 24 U/L
ANION GAP SERPL CALC-SCNC: 11 MMOL/L
AST SERPL-CCNC: 23 U/L
BILIRUB SERPL-MCNC: 0.4 MG/DL
BUN SERPL-MCNC: 15 MG/DL
CALCIUM SERPL-MCNC: 9.7 MG/DL
CHLORIDE SERPL-SCNC: 103 MMOL/L
CHOLEST SERPL-MCNC: 200 MG/DL
CO2 SERPL-SCNC: 28 MMOL/L
CREAT SERPL-MCNC: 0.93 MG/DL
EGFR: 68 ML/MIN/1.73M2
ESTIMATED AVERAGE GLUCOSE: 117 MG/DL
GLUCOSE SERPL-MCNC: 84 MG/DL
HBA1C MFR BLD HPLC: 5.7 %
HDLC SERPL-MCNC: 73 MG/DL
LDLC SERPL CALC-MCNC: 118 MG/DL
NONHDLC SERPL-MCNC: 127 MG/DL
POTASSIUM SERPL-SCNC: 4.6 MMOL/L
PROT SERPL-MCNC: 6.4 G/DL
SODIUM SERPL-SCNC: 143 MMOL/L
TRIGL SERPL-MCNC: 47 MG/DL

## 2024-11-22 ENCOUNTER — NON-APPOINTMENT (OUTPATIENT)
Age: 64
End: 2024-11-22

## 2024-12-31 ENCOUNTER — APPOINTMENT (OUTPATIENT)
Dept: UROLOGY | Facility: CLINIC | Age: 64
End: 2024-12-31
Payer: COMMERCIAL

## 2024-12-31 ENCOUNTER — NON-APPOINTMENT (OUTPATIENT)
Age: 64
End: 2024-12-31

## 2024-12-31 VITALS — DIASTOLIC BLOOD PRESSURE: 84 MMHG | SYSTOLIC BLOOD PRESSURE: 129 MMHG

## 2024-12-31 DIAGNOSIS — Z87.442 PERSONAL HISTORY OF URINARY CALCULI: ICD-10-CM

## 2024-12-31 DIAGNOSIS — R82.992 HYPEROXALURIA: ICD-10-CM

## 2024-12-31 PROCEDURE — 99214 OFFICE O/P EST MOD 30 MIN: CPT

## 2025-01-01 PROBLEM — R82.992 HYPEROXALURIA: Status: ACTIVE | Noted: 2025-01-01

## 2025-02-05 ENCOUNTER — APPOINTMENT (OUTPATIENT)
Dept: INTERNAL MEDICINE | Facility: CLINIC | Age: 65
End: 2025-02-05
Payer: COMMERCIAL

## 2025-02-05 VITALS
OXYGEN SATURATION: 99 % | TEMPERATURE: 98 F | SYSTOLIC BLOOD PRESSURE: 125 MMHG | DIASTOLIC BLOOD PRESSURE: 85 MMHG | HEIGHT: 67 IN | WEIGHT: 169 LBS | HEART RATE: 74 BPM | RESPIRATION RATE: 15 BRPM | BODY MASS INDEX: 26.53 KG/M2

## 2025-02-05 DIAGNOSIS — R51.9 HEADACHE, UNSPECIFIED: ICD-10-CM

## 2025-02-05 PROCEDURE — 99213 OFFICE O/P EST LOW 20 MIN: CPT

## 2025-02-05 PROCEDURE — G2211 COMPLEX E/M VISIT ADD ON: CPT

## 2025-02-05 RX ORDER — MUPIROCIN 20 MG/G
2 OINTMENT TOPICAL TWICE DAILY
Qty: 1 | Refills: 0 | Status: ACTIVE | COMMUNITY
Start: 2025-02-05 | End: 1900-01-01

## 2025-02-05 RX ORDER — FLUTICASONE PROPIONATE 50 UG/1
50 SPRAY, METERED NASAL
Qty: 1 | Refills: 0 | Status: ACTIVE | COMMUNITY
Start: 2025-02-05 | End: 1900-01-01

## 2025-02-11 ENCOUNTER — NON-APPOINTMENT (OUTPATIENT)
Age: 65
End: 2025-02-11

## 2025-02-11 ENCOUNTER — APPOINTMENT (OUTPATIENT)
Dept: OTOLARYNGOLOGY | Facility: CLINIC | Age: 65
End: 2025-02-11
Payer: COMMERCIAL

## 2025-02-11 VITALS — HEART RATE: 81 BPM | SYSTOLIC BLOOD PRESSURE: 119 MMHG | DIASTOLIC BLOOD PRESSURE: 73 MMHG | TEMPERATURE: 98 F

## 2025-02-11 PROBLEM — J34.89 PAIN OF NOSE: Status: ACTIVE | Noted: 2025-02-05

## 2025-02-11 PROCEDURE — 31231 NASAL ENDOSCOPY DX: CPT

## 2025-02-11 PROCEDURE — 99204 OFFICE O/P NEW MOD 45 MIN: CPT | Mod: 25

## 2025-02-11 RX ORDER — SULFAMETHOXAZOLE AND TRIMETHOPRIM 800; 160 MG/1; MG/1
800-160 TABLET ORAL TWICE DAILY
Qty: 14 | Refills: 0 | Status: ACTIVE | COMMUNITY
Start: 2025-02-11 | End: 1900-01-01

## 2025-02-18 ENCOUNTER — APPOINTMENT (OUTPATIENT)
Dept: OTOLARYNGOLOGY | Facility: CLINIC | Age: 65
End: 2025-02-18
Payer: COMMERCIAL

## 2025-02-18 VITALS
BODY MASS INDEX: 26.53 KG/M2 | HEART RATE: 72 BPM | TEMPERATURE: 97.3 F | SYSTOLIC BLOOD PRESSURE: 116 MMHG | DIASTOLIC BLOOD PRESSURE: 67 MMHG | HEIGHT: 67 IN | WEIGHT: 169 LBS

## 2025-02-18 DIAGNOSIS — J34.89 OTHER SPECIFIED DISORDERS OF NOSE AND NASAL SINUSES: ICD-10-CM

## 2025-02-18 LAB — BACTERIA WND CULT: ABNORMAL

## 2025-02-18 PROCEDURE — 99213 OFFICE O/P EST LOW 20 MIN: CPT

## 2025-02-20 ENCOUNTER — OUTPATIENT (OUTPATIENT)
Dept: OUTPATIENT SERVICES | Facility: HOSPITAL | Age: 65
LOS: 1 days | End: 2025-02-20
Payer: COMMERCIAL

## 2025-02-20 ENCOUNTER — APPOINTMENT (OUTPATIENT)
Dept: ULTRASOUND IMAGING | Facility: IMAGING CENTER | Age: 65
End: 2025-02-20
Payer: COMMERCIAL

## 2025-02-20 DIAGNOSIS — N20.1 CALCULUS OF URETER: ICD-10-CM

## 2025-02-20 PROCEDURE — 76770 US EXAM ABDO BACK WALL COMP: CPT | Mod: 26

## 2025-02-20 PROCEDURE — 76770 US EXAM ABDO BACK WALL COMP: CPT

## 2025-02-27 ENCOUNTER — APPOINTMENT (OUTPATIENT)
Dept: UROLOGY | Facility: CLINIC | Age: 65
End: 2025-02-27

## 2025-03-03 ENCOUNTER — APPOINTMENT (OUTPATIENT)
Dept: UROLOGY | Facility: CLINIC | Age: 65
End: 2025-03-03
Payer: COMMERCIAL

## 2025-03-03 VITALS
HEART RATE: 78 BPM | HEIGHT: 67 IN | TEMPERATURE: 98 F | SYSTOLIC BLOOD PRESSURE: 148 MMHG | BODY MASS INDEX: 27.47 KG/M2 | WEIGHT: 175 LBS | OXYGEN SATURATION: 98 % | DIASTOLIC BLOOD PRESSURE: 84 MMHG

## 2025-03-03 DIAGNOSIS — N20.0 CALCULUS OF KIDNEY: ICD-10-CM

## 2025-03-03 DIAGNOSIS — R82.991 HYPOCITRATURIA: ICD-10-CM

## 2025-03-03 PROCEDURE — 99213 OFFICE O/P EST LOW 20 MIN: CPT

## 2025-03-03 RX ORDER — POTASSIUM CITRATE 10 MEQ/1
10 MEQ TABLET, EXTENDED RELEASE ORAL TWICE DAILY
Qty: 180 | Refills: 3 | Status: ACTIVE | COMMUNITY
Start: 2025-03-03 | End: 1900-01-01

## 2025-03-18 ENCOUNTER — NON-APPOINTMENT (OUTPATIENT)
Age: 65
End: 2025-03-18

## 2025-03-18 ENCOUNTER — APPOINTMENT (OUTPATIENT)
Dept: INTERNAL MEDICINE | Facility: CLINIC | Age: 65
End: 2025-03-18
Payer: COMMERCIAL

## 2025-03-18 ENCOUNTER — APPOINTMENT (OUTPATIENT)
Dept: DERMATOLOGY | Facility: CLINIC | Age: 65
End: 2025-03-18
Payer: COMMERCIAL

## 2025-03-18 VITALS — DIASTOLIC BLOOD PRESSURE: 72 MMHG | SYSTOLIC BLOOD PRESSURE: 128 MMHG

## 2025-03-18 VITALS
DIASTOLIC BLOOD PRESSURE: 82 MMHG | OXYGEN SATURATION: 98 % | WEIGHT: 168 LBS | SYSTOLIC BLOOD PRESSURE: 138 MMHG | TEMPERATURE: 98.2 F | HEART RATE: 75 BPM | BODY MASS INDEX: 26.37 KG/M2 | HEIGHT: 67 IN

## 2025-03-18 DIAGNOSIS — E78.5 HYPERLIPIDEMIA, UNSPECIFIED: ICD-10-CM

## 2025-03-18 DIAGNOSIS — E55.9 VITAMIN D DEFICIENCY, UNSPECIFIED: ICD-10-CM

## 2025-03-18 DIAGNOSIS — L30.9 DERMATITIS, UNSPECIFIED: ICD-10-CM

## 2025-03-18 DIAGNOSIS — L85.3 XEROSIS CUTIS: ICD-10-CM

## 2025-03-18 DIAGNOSIS — R73.03 PREDIABETES.: ICD-10-CM

## 2025-03-18 DIAGNOSIS — Z00.00 ENCOUNTER FOR GENERAL ADULT MEDICAL EXAMINATION W/OUT ABNORMAL FINDINGS: ICD-10-CM

## 2025-03-18 DIAGNOSIS — I10 ESSENTIAL (PRIMARY) HYPERTENSION: ICD-10-CM

## 2025-03-18 DIAGNOSIS — Z23 ENCOUNTER FOR IMMUNIZATION: ICD-10-CM

## 2025-03-18 PROCEDURE — 93000 ELECTROCARDIOGRAM COMPLETE: CPT

## 2025-03-18 PROCEDURE — 99204 OFFICE O/P NEW MOD 45 MIN: CPT

## 2025-03-18 PROCEDURE — 99397 PER PM REEVAL EST PAT 65+ YR: CPT

## 2025-03-18 PROCEDURE — 36415 COLL VENOUS BLD VENIPUNCTURE: CPT

## 2025-03-18 RX ORDER — TRIAMCINOLONE ACETONIDE 1 MG/G
0.1 OINTMENT TOPICAL
Qty: 1 | Refills: 2 | Status: ACTIVE | COMMUNITY
Start: 2025-03-18 | End: 1900-01-01

## 2025-03-19 DIAGNOSIS — D69.6 THROMBOCYTOPENIA, UNSPECIFIED: ICD-10-CM

## 2025-03-19 LAB
25(OH)D3 SERPL-MCNC: 22.2 NG/ML
ALBUMIN SERPL ELPH-MCNC: 4.3 G/DL
ALP BLD-CCNC: 77 U/L
ALT SERPL-CCNC: 16 U/L
ANION GAP SERPL CALC-SCNC: 9 MMOL/L
AST SERPL-CCNC: 17 U/L
BILIRUB SERPL-MCNC: 0.3 MG/DL
BUN SERPL-MCNC: 14 MG/DL
CALCIUM SERPL-MCNC: 9.9 MG/DL
CHLORIDE SERPL-SCNC: 104 MMOL/L
CHOLEST SERPL-MCNC: 216 MG/DL
CO2 SERPL-SCNC: 30 MMOL/L
CREAT SERPL-MCNC: 0.84 MG/DL
EGFRCR SERPLBLD CKD-EPI 2021: 77 ML/MIN/1.73M2
ESTIMATED AVERAGE GLUCOSE: 123 MG/DL
GLUCOSE SERPL-MCNC: 89 MG/DL
HBA1C MFR BLD HPLC: 5.9 %
HCT VFR BLD CALC: 43.5 %
HDLC SERPL-MCNC: 76 MG/DL
HGB BLD-MCNC: 14 G/DL
LDLC SERPL-MCNC: 128 MG/DL
MCHC RBC-ENTMCNC: 31.7 PG
MCHC RBC-ENTMCNC: 32.2 G/DL
MCV RBC AUTO: 98.6 FL
NONHDLC SERPL-MCNC: 140 MG/DL
PLATELET # BLD AUTO: 145 K/UL
POTASSIUM SERPL-SCNC: 4.8 MMOL/L
PROT SERPL-MCNC: 6.7 G/DL
RBC # BLD: 4.41 M/UL
RBC # FLD: 14.1 %
SODIUM SERPL-SCNC: 143 MMOL/L
TRIGL SERPL-MCNC: 66 MG/DL
TSH SERPL-ACNC: 1.27 UIU/ML
WBC # FLD AUTO: 6.45 K/UL

## 2025-06-03 ENCOUNTER — APPOINTMENT (OUTPATIENT)
Dept: INTERNAL MEDICINE | Facility: CLINIC | Age: 65
End: 2025-06-03

## 2025-06-19 ENCOUNTER — APPOINTMENT (OUTPATIENT)
Dept: DERMATOLOGY | Facility: CLINIC | Age: 65
End: 2025-06-19
Payer: COMMERCIAL

## 2025-06-19 ENCOUNTER — NON-APPOINTMENT (OUTPATIENT)
Age: 65
End: 2025-06-19

## 2025-06-19 ENCOUNTER — APPOINTMENT (OUTPATIENT)
Dept: ORTHOPEDIC SURGERY | Facility: CLINIC | Age: 65
End: 2025-06-19

## 2025-06-19 PROCEDURE — 99214 OFFICE O/P EST MOD 30 MIN: CPT

## 2025-06-19 PROCEDURE — 76882 US LMTD JT/FCL EVL NVASC XTR: CPT | Mod: RT

## 2025-06-19 PROCEDURE — 99214 OFFICE O/P EST MOD 30 MIN: CPT | Mod: 25

## 2025-07-08 ENCOUNTER — APPOINTMENT (OUTPATIENT)
Dept: INTERNAL MEDICINE | Facility: CLINIC | Age: 65
End: 2025-07-08
Payer: COMMERCIAL

## 2025-07-08 VITALS
HEART RATE: 66 BPM | OXYGEN SATURATION: 99 % | HEIGHT: 67 IN | WEIGHT: 171 LBS | SYSTOLIC BLOOD PRESSURE: 143 MMHG | DIASTOLIC BLOOD PRESSURE: 83 MMHG | BODY MASS INDEX: 26.84 KG/M2 | RESPIRATION RATE: 15 BRPM | TEMPERATURE: 98.2 F

## 2025-07-08 VITALS — DIASTOLIC BLOOD PRESSURE: 76 MMHG | SYSTOLIC BLOOD PRESSURE: 128 MMHG

## 2025-07-08 PROCEDURE — G2211 COMPLEX E/M VISIT ADD ON: CPT

## 2025-07-08 PROCEDURE — 99214 OFFICE O/P EST MOD 30 MIN: CPT

## 2025-07-09 LAB
ALBUMIN SERPL ELPH-MCNC: 4.2 G/DL
ALP BLD-CCNC: 83 U/L
ALT SERPL-CCNC: 31 U/L
ANION GAP SERPL CALC-SCNC: 11 MMOL/L
AST SERPL-CCNC: 25 U/L
BILIRUB SERPL-MCNC: 0.4 MG/DL
BUN SERPL-MCNC: 18 MG/DL
CALCIUM SERPL-MCNC: 9.6 MG/DL
CHLORIDE SERPL-SCNC: 103 MMOL/L
CHOLEST SERPL-MCNC: 213 MG/DL
CO2 SERPL-SCNC: 27 MMOL/L
CREAT SERPL-MCNC: 0.87 MG/DL
EGFRCR SERPLBLD CKD-EPI 2021: 73 ML/MIN/1.73M2
ESTIMATED AVERAGE GLUCOSE: 123 MG/DL
GLUCOSE SERPL-MCNC: 88 MG/DL
HBA1C MFR BLD HPLC: 5.9 %
HDLC SERPL-MCNC: 71 MG/DL
LDLC SERPL-MCNC: 134 MG/DL
NONHDLC SERPL-MCNC: 142 MG/DL
POTASSIUM SERPL-SCNC: 4.7 MMOL/L
PROT SERPL-MCNC: 6.6 G/DL
SODIUM SERPL-SCNC: 141 MMOL/L
TRIGL SERPL-MCNC: 44 MG/DL

## 2025-08-05 ENCOUNTER — TRANSCRIPTION ENCOUNTER (OUTPATIENT)
Age: 65
End: 2025-08-05

## 2025-09-14 ENCOUNTER — EMERGENCY (EMERGENCY)
Facility: HOSPITAL | Age: 65
LOS: 0 days | Discharge: HOME | End: 2025-09-14
Attending: STUDENT IN AN ORGANIZED HEALTH CARE EDUCATION/TRAINING PROGRAM
Payer: COMMERCIAL

## 2025-09-14 VITALS
SYSTOLIC BLOOD PRESSURE: 164 MMHG | HEART RATE: 79 BPM | DIASTOLIC BLOOD PRESSURE: 91 MMHG | RESPIRATION RATE: 18 BRPM | OXYGEN SATURATION: 96 % | TEMPERATURE: 99 F

## 2025-09-14 VITALS
HEART RATE: 87 BPM | SYSTOLIC BLOOD PRESSURE: 159 MMHG | OXYGEN SATURATION: 98 % | TEMPERATURE: 98 F | HEIGHT: 67 IN | DIASTOLIC BLOOD PRESSURE: 87 MMHG | WEIGHT: 177.91 LBS | RESPIRATION RATE: 20 BRPM

## 2025-09-14 DIAGNOSIS — J02.9 ACUTE PHARYNGITIS, UNSPECIFIED: ICD-10-CM

## 2025-09-14 DIAGNOSIS — R05.1 ACUTE COUGH: ICD-10-CM

## 2025-09-14 DIAGNOSIS — R10.33 PERIUMBILICAL PAIN: ICD-10-CM

## 2025-09-14 DIAGNOSIS — K52.9 NONINFECTIVE GASTROENTERITIS AND COLITIS, UNSPECIFIED: ICD-10-CM

## 2025-09-14 LAB
ALBUMIN SERPL ELPH-MCNC: 3.6 G/DL — SIGNIFICANT CHANGE UP (ref 3.3–5)
ALP SERPL-CCNC: 84 U/L — SIGNIFICANT CHANGE UP (ref 40–120)
ALT FLD-CCNC: 43 U/L — SIGNIFICANT CHANGE UP (ref 12–78)
ANION GAP SERPL CALC-SCNC: 5 MMOL/L — SIGNIFICANT CHANGE UP (ref 5–17)
APPEARANCE UR: CLEAR — SIGNIFICANT CHANGE UP
AST SERPL-CCNC: 23 U/L — SIGNIFICANT CHANGE UP (ref 15–37)
BACTERIA # UR AUTO: ABNORMAL /HPF
BASOPHILS # BLD AUTO: 0.05 K/UL — SIGNIFICANT CHANGE UP (ref 0–0.2)
BASOPHILS NFR BLD AUTO: 0.4 % — SIGNIFICANT CHANGE UP (ref 0–2)
BILIRUB SERPL-MCNC: 0.5 MG/DL — SIGNIFICANT CHANGE UP (ref 0.2–1.2)
BILIRUB UR-MCNC: NEGATIVE — SIGNIFICANT CHANGE UP
BUN SERPL-MCNC: 12 MG/DL — SIGNIFICANT CHANGE UP (ref 7–23)
CALCIUM SERPL-MCNC: 9.2 MG/DL — SIGNIFICANT CHANGE UP (ref 8.5–10.1)
CHLORIDE SERPL-SCNC: 108 MMOL/L — SIGNIFICANT CHANGE UP (ref 96–108)
CO2 SERPL-SCNC: 28 MMOL/L — SIGNIFICANT CHANGE UP (ref 22–31)
COLOR SPEC: YELLOW — SIGNIFICANT CHANGE UP
CREAT SERPL-MCNC: 0.88 MG/DL — SIGNIFICANT CHANGE UP (ref 0.5–1.3)
DIFF PNL FLD: ABNORMAL
EGFR: 72 ML/MIN/1.73M2 — SIGNIFICANT CHANGE UP
EGFR: 72 ML/MIN/1.73M2 — SIGNIFICANT CHANGE UP
EOSINOPHIL # BLD AUTO: 0.23 K/UL — SIGNIFICANT CHANGE UP (ref 0–0.5)
EOSINOPHIL NFR BLD AUTO: 2 % — SIGNIFICANT CHANGE UP (ref 0–6)
EPI CELLS # UR: PRESENT
FLUAV AG NPH QL: SIGNIFICANT CHANGE UP
FLUBV AG NPH QL: SIGNIFICANT CHANGE UP
GLUCOSE SERPL-MCNC: 101 MG/DL — HIGH (ref 70–99)
GLUCOSE UR QL: NEGATIVE MG/DL — SIGNIFICANT CHANGE UP
HCT VFR BLD CALC: 43.5 % — SIGNIFICANT CHANGE UP (ref 34.5–45)
HGB BLD-MCNC: 14.1 G/DL — SIGNIFICANT CHANGE UP (ref 11.5–15.5)
IMM GRANULOCYTES NFR BLD AUTO: 0.2 % — SIGNIFICANT CHANGE UP (ref 0–0.9)
KETONES UR QL: NEGATIVE MG/DL — SIGNIFICANT CHANGE UP
LACTATE SERPL-SCNC: 1.8 MMOL/L — SIGNIFICANT CHANGE UP (ref 0.7–2)
LEUKOCYTE ESTERASE UR-ACNC: NEGATIVE — SIGNIFICANT CHANGE UP
LIDOCAIN IGE QN: 26 U/L — SIGNIFICANT CHANGE UP (ref 13–75)
LYMPHOCYTES # BLD AUTO: 27.5 % — SIGNIFICANT CHANGE UP (ref 13–44)
LYMPHOCYTES # BLD AUTO: 3.09 K/UL — SIGNIFICANT CHANGE UP (ref 1–3.3)
MCHC RBC-ENTMCNC: 31.8 PG — SIGNIFICANT CHANGE UP (ref 27–34)
MCHC RBC-ENTMCNC: 32.4 G/DL — SIGNIFICANT CHANGE UP (ref 32–36)
MCV RBC AUTO: 98 FL — SIGNIFICANT CHANGE UP (ref 80–100)
MONOCYTES # BLD AUTO: 1.19 K/UL — HIGH (ref 0–0.9)
MONOCYTES NFR BLD AUTO: 10.6 % — SIGNIFICANT CHANGE UP (ref 2–14)
NEUTROPHILS # BLD AUTO: 6.66 K/UL — SIGNIFICANT CHANGE UP (ref 1.8–7.4)
NEUTROPHILS NFR BLD AUTO: 59.3 % — SIGNIFICANT CHANGE UP (ref 43–77)
NITRITE UR-MCNC: NEGATIVE — SIGNIFICANT CHANGE UP
NRBC BLD AUTO-RTO: 0 /100 WBCS — SIGNIFICANT CHANGE UP (ref 0–0)
PH UR: 7 — SIGNIFICANT CHANGE UP (ref 5–8)
PLATELET # BLD AUTO: 195 K/UL — SIGNIFICANT CHANGE UP (ref 150–400)
POTASSIUM SERPL-MCNC: 4.1 MMOL/L — SIGNIFICANT CHANGE UP (ref 3.5–5.3)
POTASSIUM SERPL-SCNC: 4.1 MMOL/L — SIGNIFICANT CHANGE UP (ref 3.5–5.3)
PROT SERPL-MCNC: 7 GM/DL — SIGNIFICANT CHANGE UP (ref 6–8.3)
PROT UR-MCNC: NEGATIVE MG/DL — SIGNIFICANT CHANGE UP
RBC # BLD: 4.44 M/UL — SIGNIFICANT CHANGE UP (ref 3.8–5.2)
RBC # FLD: 13.7 % — SIGNIFICANT CHANGE UP (ref 10.3–14.5)
RBC CASTS # UR COMP ASSIST: 3 /HPF — SIGNIFICANT CHANGE UP (ref 0–4)
RSV RNA NPH QL NAA+NON-PROBE: SIGNIFICANT CHANGE UP
SARS-COV-2 RNA SPEC QL NAA+PROBE: SIGNIFICANT CHANGE UP
SODIUM SERPL-SCNC: 141 MMOL/L — SIGNIFICANT CHANGE UP (ref 135–145)
SOURCE RESPIRATORY: SIGNIFICANT CHANGE UP
SP GR SPEC: 1.01 — SIGNIFICANT CHANGE UP (ref 1–1.03)
UROBILINOGEN FLD QL: 0.2 MG/DL — SIGNIFICANT CHANGE UP (ref 0.2–1)
WBC # BLD: 11.24 K/UL — HIGH (ref 3.8–10.5)
WBC # FLD AUTO: 11.24 K/UL — HIGH (ref 3.8–10.5)
WBC UR QL: 2 /HPF — SIGNIFICANT CHANGE UP (ref 0–5)

## 2025-09-14 PROCEDURE — 74177 CT ABD & PELVIS W/CONTRAST: CPT | Mod: 26

## 2025-09-14 PROCEDURE — 99285 EMERGENCY DEPT VISIT HI MDM: CPT

## 2025-09-14 PROCEDURE — 71046 X-RAY EXAM CHEST 2 VIEWS: CPT | Mod: 26

## 2025-09-14 RX ORDER — ACETAMINOPHEN 500 MG/5ML
1000 LIQUID (ML) ORAL ONCE
Refills: 0 | Status: COMPLETED | OUTPATIENT
Start: 2025-09-14 | End: 2025-09-14

## 2025-09-14 RX ADMIN — Medication 1000 MILLILITER(S): at 19:34

## 2025-09-14 RX ADMIN — Medication 1000 MILLIGRAM(S): at 20:40

## 2025-09-14 RX ADMIN — Medication 4 MILLIGRAM(S): at 21:37

## 2025-09-14 RX ADMIN — Medication 4 MILLIGRAM(S): at 21:07

## 2025-09-14 RX ADMIN — Medication 1000 MILLIGRAM(S): at 19:33

## 2025-09-14 RX ADMIN — Medication 400 MILLIGRAM(S): at 19:33

## 2025-09-15 LAB — S PYO DNA THROAT QL NAA+PROBE: SIGNIFICANT CHANGE UP

## 2025-09-26 PROBLEM — D72.829 ELEVATED WBCS: Status: ACTIVE | Noted: 2025-09-26

## 2025-09-26 PROBLEM — I72.2 RENAL ARTERY ANEURYSM: Status: ACTIVE | Noted: 2025-09-26
